# Patient Record
Sex: MALE | Race: WHITE | Employment: OTHER | URBAN - METROPOLITAN AREA
[De-identification: names, ages, dates, MRNs, and addresses within clinical notes are randomized per-mention and may not be internally consistent; named-entity substitution may affect disease eponyms.]

---

## 2017-06-16 PROBLEM — E21.3 HYPERPARATHYROIDISM (HCC): Chronic | Status: ACTIVE | Noted: 2017-06-16

## 2017-06-16 PROBLEM — E21.3 HYPERPARATHYROIDISM (HCC): Status: ACTIVE | Noted: 2017-06-16

## 2022-01-01 ENCOUNTER — APPOINTMENT (OUTPATIENT)
Dept: CT IMAGING | Age: 62
DRG: 208 | End: 2022-01-01
Payer: COMMERCIAL

## 2022-01-01 ENCOUNTER — APPOINTMENT (OUTPATIENT)
Dept: GENERAL RADIOLOGY | Age: 62
DRG: 208 | End: 2022-01-01
Payer: COMMERCIAL

## 2022-01-01 ENCOUNTER — HOSPITAL ENCOUNTER (INPATIENT)
Age: 62
LOS: 2 days | DRG: 208 | End: 2022-10-12
Attending: EMERGENCY MEDICINE | Admitting: INTERNAL MEDICINE
Payer: COMMERCIAL

## 2022-01-01 VITALS
HEIGHT: 62 IN | HEART RATE: 67 BPM | DIASTOLIC BLOOD PRESSURE: 40 MMHG | BODY MASS INDEX: 30.1 KG/M2 | OXYGEN SATURATION: 97 % | TEMPERATURE: 97 F | SYSTOLIC BLOOD PRESSURE: 107 MMHG | WEIGHT: 163.58 LBS | RESPIRATION RATE: 12 BRPM

## 2022-01-01 DIAGNOSIS — I46.9 CARDIAC ARREST (HCC): Primary | ICD-10-CM

## 2022-01-01 LAB
ABSOLUTE EOS #: 0 K/UL (ref 0–0.4)
ABSOLUTE EOS #: 0 K/UL (ref 0–0.4)
ABSOLUTE EOS #: 0.27 K/UL (ref 0–0.4)
ABSOLUTE IMMATURE GRANULOCYTE: 0 K/UL (ref 0–0.3)
ABSOLUTE IMMATURE GRANULOCYTE: 0 K/UL (ref 0–0.3)
ABSOLUTE IMMATURE GRANULOCYTE: 2.66 K/UL (ref 0–0.3)
ABSOLUTE LYMPH #: 1.33 K/UL (ref 1–4.8)
ABSOLUTE LYMPH #: 2.27 K/UL (ref 1–4.8)
ABSOLUTE LYMPH #: 2.94 K/UL (ref 1–4.8)
ABSOLUTE MONO #: 0.59 K/UL (ref 0.1–0.8)
ABSOLUTE MONO #: 1.52 K/UL (ref 0.1–0.8)
ABSOLUTE MONO #: 1.6 K/UL (ref 0.1–0.8)
ALBUMIN SERPL-MCNC: 2.9 G/DL (ref 3.5–5.2)
ALBUMIN SERPL-MCNC: 3 G/DL (ref 3.5–5.2)
ALBUMIN SERPL-MCNC: 3.3 G/DL (ref 3.5–5.2)
ALBUMIN/GLOBULIN RATIO: 1.3 (ref 1–2.5)
ALBUMIN/GLOBULIN RATIO: 1.4 (ref 1–2.5)
ALBUMIN/GLOBULIN RATIO: 1.5 (ref 1–2.5)
ALLEN TEST: ABNORMAL
ALLEN TEST: NEGATIVE
ALP BLD-CCNC: 174 U/L (ref 40–129)
ALP BLD-CCNC: 188 U/L (ref 40–129)
ALP BLD-CCNC: 204 U/L (ref 40–129)
ALT SERPL-CCNC: 103 U/L (ref 5–41)
ALT SERPL-CCNC: 119 U/L (ref 5–41)
ALT SERPL-CCNC: 173 U/L (ref 5–41)
ANION GAP SERPL CALCULATED.3IONS-SCNC: 15 MMOL/L (ref 9–17)
ANION GAP SERPL CALCULATED.3IONS-SCNC: 15 MMOL/L (ref 9–17)
ANION GAP SERPL CALCULATED.3IONS-SCNC: 17 MMOL/L (ref 9–17)
ANION GAP SERPL CALCULATED.3IONS-SCNC: 17 MMOL/L (ref 9–17)
ANION GAP SERPL CALCULATED.3IONS-SCNC: 19 MMOL/L (ref 9–17)
ANION GAP SERPL CALCULATED.3IONS-SCNC: 20 MMOL/L (ref 9–17)
ANION GAP SERPL CALCULATED.3IONS-SCNC: 20 MMOL/L (ref 9–17)
ANION GAP SERPL CALCULATED.3IONS-SCNC: 22 MMOL/L (ref 9–17)
ANION GAP SERPL CALCULATED.3IONS-SCNC: 22 MMOL/L (ref 9–17)
ANION GAP SERPL CALCULATED.3IONS-SCNC: 24 MMOL/L (ref 9–17)
ANION GAP SERPL CALCULATED.3IONS-SCNC: 28 MMOL/L (ref 9–17)
ANION GAP: 12 MMOL/L (ref 7–16)
ANION GAP: 13 MMOL/L (ref 7–16)
AST SERPL-CCNC: 265 U/L
AST SERPL-CCNC: 67 U/L
AST SERPL-CCNC: 94 U/L
BASOPHILS # BLD: 0 % (ref 0–2)
BASOPHILS ABSOLUTE: 0 K/UL (ref 0–0.2)
BILIRUB SERPL-MCNC: 0.4 MG/DL (ref 0.3–1.2)
BILIRUB SERPL-MCNC: 0.5 MG/DL (ref 0.3–1.2)
BILIRUB SERPL-MCNC: 0.5 MG/DL (ref 0.3–1.2)
BILIRUBIN URINE: ABNORMAL
BUN BLDV-MCNC: 32 MG/DL (ref 8–23)
BUN BLDV-MCNC: 34 MG/DL (ref 8–23)
BUN BLDV-MCNC: 36 MG/DL (ref 8–23)
BUN BLDV-MCNC: 37 MG/DL (ref 8–23)
BUN BLDV-MCNC: 39 MG/DL (ref 8–23)
BUN BLDV-MCNC: 41 MG/DL (ref 8–23)
BUN BLDV-MCNC: 46 MG/DL (ref 8–23)
BUN BLDV-MCNC: 50 MG/DL (ref 8–23)
BUN BLDV-MCNC: 56 MG/DL (ref 8–23)
BUN BLDV-MCNC: 61 MG/DL (ref 8–23)
BUN BLDV-MCNC: 63 MG/DL (ref 8–23)
CALCIUM IONIZED: 1.12 MMOL/L (ref 1.13–1.33)
CALCIUM IONIZED: 1.14 MMOL/L (ref 1.13–1.33)
CALCIUM IONIZED: 1.15 MMOL/L (ref 1.13–1.33)
CALCIUM IONIZED: 1.16 MMOL/L (ref 1.13–1.33)
CALCIUM IONIZED: 1.16 MMOL/L (ref 1.13–1.33)
CALCIUM IONIZED: 1.18 MMOL/L (ref 1.13–1.33)
CALCIUM IONIZED: 1.2 MMOL/L (ref 1.13–1.33)
CALCIUM SERPL-MCNC: 8.2 MG/DL (ref 8.6–10.4)
CALCIUM SERPL-MCNC: 8.3 MG/DL (ref 8.6–10.4)
CALCIUM SERPL-MCNC: 8.4 MG/DL (ref 8.6–10.4)
CALCIUM SERPL-MCNC: 8.5 MG/DL (ref 8.6–10.4)
CALCIUM SERPL-MCNC: 8.6 MG/DL (ref 8.6–10.4)
CASTS UA: ABNORMAL /LPF (ref 0–2)
CASTS UA: ABNORMAL /LPF (ref 0–2)
CHLORIDE BLD-SCNC: 91 MMOL/L (ref 98–107)
CHLORIDE BLD-SCNC: 92 MMOL/L (ref 98–107)
CHLORIDE BLD-SCNC: 93 MMOL/L (ref 98–107)
CHLORIDE BLD-SCNC: 93 MMOL/L (ref 98–107)
CHLORIDE BLD-SCNC: 94 MMOL/L (ref 98–107)
CHLORIDE BLD-SCNC: 94 MMOL/L (ref 98–107)
CHLORIDE BLD-SCNC: 95 MMOL/L (ref 98–107)
CHLORIDE BLD-SCNC: 96 MMOL/L (ref 98–107)
CHLORIDE BLD-SCNC: 96 MMOL/L (ref 98–107)
CHLORIDE BLD-SCNC: 97 MMOL/L (ref 98–107)
CHLORIDE BLD-SCNC: 97 MMOL/L (ref 98–107)
CO2: 12 MMOL/L (ref 20–31)
CO2: 15 MMOL/L (ref 20–31)
CO2: 16 MMOL/L (ref 20–31)
CO2: 17 MMOL/L (ref 20–31)
CO2: 19 MMOL/L (ref 20–31)
CO2: 20 MMOL/L (ref 20–31)
COLOR: ABNORMAL
CREAT SERPL-MCNC: 1.73 MG/DL (ref 0.7–1.2)
CREAT SERPL-MCNC: 1.81 MG/DL (ref 0.7–1.2)
CREAT SERPL-MCNC: 1.87 MG/DL (ref 0.7–1.2)
CREAT SERPL-MCNC: 2.01 MG/DL (ref 0.7–1.2)
CREAT SERPL-MCNC: 2.25 MG/DL (ref 0.7–1.2)
CREAT SERPL-MCNC: 2.52 MG/DL (ref 0.7–1.2)
CREAT SERPL-MCNC: 2.74 MG/DL (ref 0.7–1.2)
CREAT SERPL-MCNC: 3.11 MG/DL (ref 0.7–1.2)
CREAT SERPL-MCNC: 3.48 MG/DL (ref 0.7–1.2)
CREAT SERPL-MCNC: 4.3 MG/DL (ref 0.7–1.2)
CREAT SERPL-MCNC: 4.6 MG/DL (ref 0.7–1.2)
CULTURE: NO GROWTH
CULTURE: NORMAL
EGFR, POC: 14 ML/MIN/1.73M2
EGFR, POC: 16 ML/MIN/1.73M2
EKG ATRIAL RATE: 57 BPM
EKG ATRIAL RATE: 58 BPM
EKG ATRIAL RATE: 59 BPM
EKG P AXIS: 59 DEGREES
EKG P AXIS: 60 DEGREES
EKG P AXIS: 62 DEGREES
EKG P-R INTERVAL: 154 MS
EKG P-R INTERVAL: 160 MS
EKG P-R INTERVAL: 166 MS
EKG Q-T INTERVAL: 426 MS
EKG Q-T INTERVAL: 444 MS
EKG Q-T INTERVAL: 466 MS
EKG QRS DURATION: 112 MS
EKG QRS DURATION: 124 MS
EKG QRS DURATION: 126 MS
EKG QTC CALCULATION (BAZETT): 418 MS
EKG QTC CALCULATION (BAZETT): 439 MS
EKG QTC CALCULATION (BAZETT): 453 MS
EKG R AXIS: -19 DEGREES
EKG R AXIS: -29 DEGREES
EKG R AXIS: -30 DEGREES
EKG T AXIS: 149 DEGREES
EKG T AXIS: 172 DEGREES
EKG T AXIS: 180 DEGREES
EKG VENTRICULAR RATE: 57 BPM
EKG VENTRICULAR RATE: 58 BPM
EKG VENTRICULAR RATE: 59 BPM
EOSINOPHILS RELATIVE PERCENT: 0 % (ref 1–4)
EOSINOPHILS RELATIVE PERCENT: 0 % (ref 1–4)
EOSINOPHILS RELATIVE PERCENT: 1 % (ref 1–4)
EPITHELIAL CELLS UA: ABNORMAL /HPF (ref 0–5)
FIO2: 100
FIO2: 40
FIO2: 40
FIO2: 50
FIO2: 50
GFR NON-AFRICAN AMERICAN: 13 ML/MIN
GFR NON-AFRICAN AMERICAN: 15 ML/MIN
GFR SERPL CREATININE-BSD FRML MDRD: 14 ML/MIN/1.73M2
GFR SERPL CREATININE-BSD FRML MDRD: 15 ML/MIN/1.73M2
GFR SERPL CREATININE-BSD FRML MDRD: 16 ML/MIN
GFR SERPL CREATININE-BSD FRML MDRD: 18 ML/MIN
GFR SERPL CREATININE-BSD FRML MDRD: 19 ML/MIN/1.73M2
GFR SERPL CREATININE-BSD FRML MDRD: 22 ML/MIN/1.73M2
GFR SERPL CREATININE-BSD FRML MDRD: 25 ML/MIN/1.73M2
GFR SERPL CREATININE-BSD FRML MDRD: 28 ML/MIN/1.73M2
GFR SERPL CREATININE-BSD FRML MDRD: 32 ML/MIN/1.73M2
GFR SERPL CREATININE-BSD FRML MDRD: 37 ML/MIN/1.73M2
GFR SERPL CREATININE-BSD FRML MDRD: 40 ML/MIN/1.73M2
GFR SERPL CREATININE-BSD FRML MDRD: 41 ML/MIN/1.73M2
GFR SERPL CREATININE-BSD FRML MDRD: 44 ML/MIN/1.73M2
GLUCOSE BLD-MCNC: 115 MG/DL (ref 70–99)
GLUCOSE BLD-MCNC: 119 MG/DL (ref 75–110)
GLUCOSE BLD-MCNC: 123 MG/DL (ref 70–99)
GLUCOSE BLD-MCNC: 123 MG/DL (ref 74–100)
GLUCOSE BLD-MCNC: 123 MG/DL (ref 75–110)
GLUCOSE BLD-MCNC: 127 MG/DL (ref 75–110)
GLUCOSE BLD-MCNC: 130 MG/DL (ref 70–99)
GLUCOSE BLD-MCNC: 131 MG/DL (ref 74–100)
GLUCOSE BLD-MCNC: 135 MG/DL (ref 70–99)
GLUCOSE BLD-MCNC: 142 MG/DL (ref 70–99)
GLUCOSE BLD-MCNC: 155 MG/DL (ref 70–99)
GLUCOSE BLD-MCNC: 161 MG/DL (ref 70–99)
GLUCOSE BLD-MCNC: 164 MG/DL (ref 70–99)
GLUCOSE BLD-MCNC: 165 MG/DL (ref 70–99)
GLUCOSE BLD-MCNC: 165 MG/DL (ref 75–110)
GLUCOSE BLD-MCNC: 199 MG/DL (ref 70–99)
GLUCOSE BLD-MCNC: 206 MG/DL (ref 70–99)
GLUCOSE BLD-MCNC: 266 MG/DL (ref 74–100)
GLUCOSE BLD-MCNC: 88 MG/DL (ref 74–100)
GLUCOSE URINE: NEGATIVE
HCO3 VENOUS: 17.7 MMOL/L (ref 22–29)
HCT VFR BLD CALC: 23.5 % (ref 40.7–50.3)
HCT VFR BLD CALC: 23.9 % (ref 40.7–50.3)
HCT VFR BLD CALC: 25.3 % (ref 40.7–50.3)
HCT VFR BLD CALC: 27.5 % (ref 40.7–50.3)
HEMOGLOBIN: 8 G/DL (ref 13–17)
HEMOGLOBIN: 8 G/DL (ref 13–17)
HEMOGLOBIN: 8.2 G/DL (ref 13–17)
HEMOGLOBIN: 8.6 G/DL (ref 13–17)
IMMATURE GRANULOCYTES: 0 %
IMMATURE GRANULOCYTES: 0 %
IMMATURE GRANULOCYTES: 10 %
INR BLD: 1.2
INR BLD: 1.4
KETONES, URINE: ABNORMAL
LACTIC ACID, WHOLE BLOOD: 1.9 MMOL/L (ref 0.7–2.1)
LACTIC ACID, WHOLE BLOOD: 2.6 MMOL/L (ref 0.7–2.1)
LACTIC ACID, WHOLE BLOOD: 3.1 MMOL/L (ref 0.7–2.1)
LACTIC ACID, WHOLE BLOOD: 3.4 MMOL/L (ref 0.7–2.1)
LACTIC ACID, WHOLE BLOOD: 4 MMOL/L (ref 0.7–2.1)
LACTIC ACID, WHOLE BLOOD: 4.8 MMOL/L (ref 0.7–2.1)
LACTIC ACID, WHOLE BLOOD: 6.2 MMOL/L (ref 0.7–2.1)
LEUKOCYTE ESTERASE, URINE: ABNORMAL
LIPASE: 31 U/L (ref 13–60)
LYMPHOCYTES # BLD: 10 % (ref 24–44)
LYMPHOCYTES # BLD: 5 % (ref 24–44)
LYMPHOCYTES # BLD: 6 % (ref 24–44)
MAGNESIUM: 1.8 MG/DL (ref 1.6–2.6)
MAGNESIUM: 1.9 MG/DL (ref 1.6–2.6)
MAGNESIUM: 2 MG/DL (ref 1.6–2.6)
MAGNESIUM: 2.2 MG/DL (ref 1.6–2.6)
MAGNESIUM: 2.2 MG/DL (ref 1.6–2.6)
MAGNESIUM: 2.3 MG/DL (ref 1.6–2.6)
MCH RBC QN AUTO: 28 PG (ref 25.2–33.5)
MCH RBC QN AUTO: 28.1 PG (ref 25.2–33.5)
MCH RBC QN AUTO: 28.2 PG (ref 25.2–33.5)
MCH RBC QN AUTO: 29.2 PG (ref 25.2–33.5)
MCHC RBC AUTO-ENTMCNC: 31.3 G/DL (ref 28.4–34.8)
MCHC RBC AUTO-ENTMCNC: 32.4 G/DL (ref 28.4–34.8)
MCHC RBC AUTO-ENTMCNC: 33.5 G/DL (ref 28.4–34.8)
MCHC RBC AUTO-ENTMCNC: 34 G/DL (ref 28.4–34.8)
MCV RBC AUTO: 83.9 FL (ref 82.6–102.9)
MCV RBC AUTO: 85.8 FL (ref 82.6–102.9)
MCV RBC AUTO: 86.9 FL (ref 82.6–102.9)
MCV RBC AUTO: 89.6 FL (ref 82.6–102.9)
MODE: ABNORMAL
MODE: ABNORMAL
MONOCYTES # BLD: 2 % (ref 1–7)
MONOCYTES # BLD: 4 % (ref 1–7)
MONOCYTES # BLD: 6 % (ref 1–7)
MORPHOLOGY: ABNORMAL
MRSA, DNA, NASAL: NEGATIVE
NEGATIVE BASE EXCESS, ART: 13 (ref 0–2)
NEGATIVE BASE EXCESS, ART: 4 (ref 0–2)
NEGATIVE BASE EXCESS, ART: 7 (ref 0–2)
NEGATIVE BASE EXCESS, ART: 8 (ref 0–2)
NEGATIVE BASE EXCESS, ART: 8 (ref 0–2)
NEGATIVE BASE EXCESS, VEN: 10 (ref 0–2)
NITRITE, URINE: NEGATIVE
NRBC AUTOMATED: 0.1 PER 100 WBC
NRBC AUTOMATED: 0.2 PER 100 WBC
O2 DEVICE/FLOW/%: ABNORMAL
O2 DEVICE/FLOW/%: ABNORMAL
O2 SAT, VEN: 20 % (ref 60–85)
PARTIAL THROMBOPLASTIN TIME: 115.5 SEC (ref 20.5–30.5)
PARTIAL THROMBOPLASTIN TIME: 21.9 SEC (ref 20.5–30.5)
PARTIAL THROMBOPLASTIN TIME: 24 SEC (ref 20.5–30.5)
PARTIAL THROMBOPLASTIN TIME: 31.1 SEC (ref 20.5–30.5)
PARTIAL THROMBOPLASTIN TIME: 42.8 SEC (ref 20.5–30.5)
PARTIAL THROMBOPLASTIN TIME: 43.5 SEC (ref 20.5–30.5)
PARTIAL THROMBOPLASTIN TIME: 53.6 SEC (ref 20.5–30.5)
PARTIAL THROMBOPLASTIN TIME: 97.8 SEC (ref 20.5–30.5)
PARTIAL THROMBOPLASTIN TIME: >120 SEC (ref 20.5–30.5)
PATIENT TEMP: 32.5
PATIENT TEMP: 35.6
PCO2, VEN: 44.1 MM HG (ref 41–51)
PDW BLD-RTO: 15.5 % (ref 11.8–14.4)
PDW BLD-RTO: 15.6 % (ref 11.8–14.4)
PDW BLD-RTO: 15.8 % (ref 11.8–14.4)
PDW BLD-RTO: 15.8 % (ref 11.8–14.4)
PH UA: 5 (ref 5–8)
PH VENOUS: 7.21 (ref 7.32–7.43)
PHOSPHORUS: 4.8 MG/DL (ref 2.5–4.5)
PHOSPHORUS: 4.9 MG/DL (ref 2.5–4.5)
PHOSPHORUS: 5.2 MG/DL (ref 2.5–4.5)
PHOSPHORUS: 5.3 MG/DL (ref 2.5–4.5)
PHOSPHORUS: 5.4 MG/DL (ref 2.5–4.5)
PHOSPHORUS: 5.5 MG/DL (ref 2.5–4.5)
PHOSPHORUS: 6.1 MG/DL (ref 2.5–4.5)
PHOSPHORUS: 6.4 MG/DL (ref 2.5–4.5)
PLATELET # BLD: 334 K/UL (ref 138–453)
PLATELET # BLD: 336 K/UL (ref 138–453)
PLATELET # BLD: 347 K/UL (ref 138–453)
PLATELET # BLD: 436 K/UL (ref 138–453)
PMV BLD AUTO: 10.6 FL (ref 8.1–13.5)
PMV BLD AUTO: 10.7 FL (ref 8.1–13.5)
PMV BLD AUTO: 10.9 FL (ref 8.1–13.5)
PMV BLD AUTO: 10.9 FL (ref 8.1–13.5)
PO2, VEN: 18.5 MM HG (ref 30–50)
POC BUN: 67 MG/DL (ref 8–26)
POC BUN: 69 MG/DL (ref 8–26)
POC CHLORIDE: 96 MMOL/L (ref 98–107)
POC CHLORIDE: 97 MMOL/L (ref 98–107)
POC CREATININE: 4.09 MG/DL (ref 0.51–1.19)
POC CREATININE: 4.51 MG/DL (ref 0.51–1.19)
POC HCO3: 11.3 MMOL/L (ref 21–28)
POC HCO3: 16.1 MMOL/L (ref 21–28)
POC HCO3: 18.2 MMOL/L (ref 21–28)
POC HCO3: 18.7 MMOL/L (ref 21–28)
POC HCO3: 21.3 MMOL/L (ref 21–28)
POC HEMATOCRIT: 23 % (ref 41–53)
POC HEMATOCRIT: 24 % (ref 41–53)
POC HEMOGLOBIN: 8 G/DL (ref 13.5–17.5)
POC HEMOGLOBIN: 8.3 G/DL (ref 13.5–17.5)
POC IONIZED CALCIUM: 1.1 MMOL/L (ref 1.15–1.33)
POC IONIZED CALCIUM: 1.11 MMOL/L (ref 1.15–1.33)
POC LACTIC ACID: 2.21 MMOL/L (ref 0.56–1.39)
POC LACTIC ACID: 5.1 MMOL/L (ref 0.56–1.39)
POC O2 SATURATION: 100 % (ref 94–98)
POC O2 SATURATION: 97 % (ref 94–98)
POC O2 SATURATION: 99 % (ref 94–98)
POC PCO2 TEMP: 31 MM HG
POC PCO2: 18.7 MM HG (ref 35–48)
POC PCO2: 27.6 MM HG (ref 35–48)
POC PCO2: 35.7 MM HG (ref 35–48)
POC PCO2: 37.6 MM HG (ref 35–48)
POC PCO2: 38.6 MM HG (ref 35–48)
POC PH TEMP: 7.36
POC PH: 7.29 (ref 7.35–7.45)
POC PH: 7.33 (ref 7.35–7.45)
POC PH: 7.35 (ref 7.35–7.45)
POC PH: 7.37 (ref 7.35–7.45)
POC PH: 7.39 (ref 7.35–7.45)
POC PO2 TEMP: 76 MM HG
POC PO2: 132.4 MM HG (ref 83–108)
POC PO2: 136 MM HG (ref 83–108)
POC PO2: 153.1 MM HG (ref 83–108)
POC PO2: 243.2 MM HG (ref 83–108)
POC PO2: 99.8 MM HG (ref 83–108)
POC POTASSIUM: 5 MMOL/L (ref 3.5–4.5)
POC POTASSIUM: 5.6 MMOL/L (ref 3.5–4.5)
POC SODIUM: 126 MMOL/L (ref 138–146)
POC SODIUM: 127 MMOL/L (ref 138–146)
POC TCO2: 18 MMOL/L (ref 22–30)
POC TCO2: 19 MMOL/L (ref 22–30)
POTASSIUM SERPL-SCNC: 4 MMOL/L (ref 3.7–5.3)
POTASSIUM SERPL-SCNC: 4.1 MMOL/L (ref 3.7–5.3)
POTASSIUM SERPL-SCNC: 4.2 MMOL/L (ref 3.7–5.3)
POTASSIUM SERPL-SCNC: 4.3 MMOL/L (ref 3.7–5.3)
POTASSIUM SERPL-SCNC: 4.6 MMOL/L (ref 3.7–5.3)
POTASSIUM SERPL-SCNC: 4.6 MMOL/L (ref 3.7–5.3)
POTASSIUM SERPL-SCNC: 4.7 MMOL/L (ref 3.7–5.3)
POTASSIUM SERPL-SCNC: 4.8 MMOL/L (ref 3.7–5.3)
POTASSIUM SERPL-SCNC: 4.9 MMOL/L (ref 3.7–5.3)
POTASSIUM SERPL-SCNC: 4.9 MMOL/L (ref 3.7–5.3)
POTASSIUM SERPL-SCNC: 5.2 MMOL/L (ref 3.7–5.3)
POTASSIUM SERPL-SCNC: 5.8 MMOL/L (ref 3.7–5.3)
PRO-BNP: ABNORMAL PG/ML
PROTEIN UA: ABNORMAL
PROTHROMBIN TIME: 11.9 SEC (ref 9.1–12.3)
PROTHROMBIN TIME: 13.6 SEC (ref 9.1–12.3)
RBC # BLD: 2.74 M/UL (ref 4.21–5.77)
RBC # BLD: 2.85 M/UL (ref 4.21–5.77)
RBC # BLD: 2.91 M/UL (ref 4.21–5.77)
RBC # BLD: 3.07 M/UL (ref 4.21–5.77)
RBC UA: ABNORMAL /HPF (ref 0–2)
REASON FOR REJECTION: NORMAL
SAMPLE SITE: ABNORMAL
SEG NEUTROPHILS: 78 % (ref 36–66)
SEG NEUTROPHILS: 88 % (ref 36–66)
SEG NEUTROPHILS: 90 % (ref 36–66)
SEGMENTED NEUTROPHILS ABSOLUTE COUNT: 20.74 K/UL (ref 1.8–7.7)
SEGMENTED NEUTROPHILS ABSOLUTE COUNT: 25.87 K/UL (ref 1.8–7.7)
SEGMENTED NEUTROPHILS ABSOLUTE COUNT: 34.11 K/UL (ref 1.8–7.7)
SODIUM BLD-SCNC: 126 MMOL/L (ref 135–144)
SODIUM BLD-SCNC: 127 MMOL/L (ref 135–144)
SODIUM BLD-SCNC: 129 MMOL/L (ref 135–144)
SODIUM BLD-SCNC: 131 MMOL/L (ref 135–144)
SODIUM BLD-SCNC: 131 MMOL/L (ref 135–144)
SODIUM BLD-SCNC: 132 MMOL/L (ref 135–144)
SODIUM BLD-SCNC: 132 MMOL/L (ref 135–144)
SODIUM BLD-SCNC: 133 MMOL/L (ref 135–144)
SODIUM BLD-SCNC: 134 MMOL/L (ref 135–144)
SODIUM BLD-SCNC: 135 MMOL/L (ref 135–144)
SODIUM BLD-SCNC: 137 MMOL/L (ref 135–144)
SPECIFIC GRAVITY UA: 1.02 (ref 1–1.03)
SPECIMEN DESCRIPTION: NORMAL
TOTAL PROTEIN: 5.1 G/DL (ref 6.4–8.3)
TOTAL PROTEIN: 5.1 G/DL (ref 6.4–8.3)
TOTAL PROTEIN: 5.5 G/DL (ref 6.4–8.3)
TROPONIN, HIGH SENSITIVITY: 2022 NG/L (ref 0–22)
TROPONIN, HIGH SENSITIVITY: 2027 NG/L (ref 0–22)
TROPONIN, HIGH SENSITIVITY: 2061 NG/L (ref 0–22)
TROPONIN, HIGH SENSITIVITY: 2711 NG/L (ref 0–22)
TROPONIN, HIGH SENSITIVITY: 6838 NG/L (ref 0–22)
TROPONIN, HIGH SENSITIVITY: 7237 NG/L (ref 0–22)
TROPONIN, HIGH SENSITIVITY: 7513 NG/L (ref 0–22)
TSH SERPL DL<=0.05 MIU/L-ACNC: 6.38 UIU/ML (ref 0.3–5)
TURBIDITY: ABNORMAL
URINE HGB: NEGATIVE
UROBILINOGEN, URINE: NORMAL
WBC # BLD: 26.6 K/UL (ref 3.5–11.3)
WBC # BLD: 29.4 K/UL (ref 3.5–11.3)
WBC # BLD: 33.1 K/UL (ref 3.5–11.3)
WBC # BLD: 37.9 K/UL (ref 3.5–11.3)
WBC UA: ABNORMAL /HPF (ref 0–5)
ZZ NTE CLEAN UP: ORDERED TEST: NORMAL
ZZ NTE WITH NAME CLEAN UP: SPECIMEN SOURCE: NORMAL

## 2022-01-01 PROCEDURE — 2580000003 HC RX 258: Performed by: INTERNAL MEDICINE

## 2022-01-01 PROCEDURE — 95720 EEG PHY/QHP EA INCR W/VEEG: CPT | Performed by: PSYCHIATRY & NEUROLOGY

## 2022-01-01 PROCEDURE — 6360000002 HC RX W HCPCS: Performed by: STUDENT IN AN ORGANIZED HEALTH CARE EDUCATION/TRAINING PROGRAM

## 2022-01-01 PROCEDURE — 2500000003 HC RX 250 WO HCPCS: Performed by: STUDENT IN AN ORGANIZED HEALTH CARE EDUCATION/TRAINING PROGRAM

## 2022-01-01 PROCEDURE — 6370000000 HC RX 637 (ALT 250 FOR IP)

## 2022-01-01 PROCEDURE — 37799 UNLISTED PX VASCULAR SURGERY: CPT

## 2022-01-01 PROCEDURE — 06HY33Z INSERTION OF INFUSION DEVICE INTO LOWER VEIN, PERCUTANEOUS APPROACH: ICD-10-PCS | Performed by: INTERNAL MEDICINE

## 2022-01-01 PROCEDURE — 85025 COMPLETE CBC W/AUTO DIFF WBC: CPT

## 2022-01-01 PROCEDURE — 2580000003 HC RX 258

## 2022-01-01 PROCEDURE — B54CZZA ULTRASONOGRAPHY OF LEFT LOWER EXTREMITY VEINS, GUIDANCE: ICD-10-PCS | Performed by: EMERGENCY MEDICINE

## 2022-01-01 PROCEDURE — 2500000003 HC RX 250 WO HCPCS: Performed by: NURSE PRACTITIONER

## 2022-01-01 PROCEDURE — C9113 INJ PANTOPRAZOLE SODIUM, VIA: HCPCS | Performed by: STUDENT IN AN ORGANIZED HEALTH CARE EDUCATION/TRAINING PROGRAM

## 2022-01-01 PROCEDURE — 2500000003 HC RX 250 WO HCPCS: Performed by: INTERNAL MEDICINE

## 2022-01-01 PROCEDURE — 95714 VEEG EA 12-26 HR UNMNTR: CPT

## 2022-01-01 PROCEDURE — 94761 N-INVAS EAR/PLS OXIMETRY MLT: CPT

## 2022-01-01 PROCEDURE — 82803 BLOOD GASES ANY COMBINATION: CPT

## 2022-01-01 PROCEDURE — 85730 THROMBOPLASTIN TIME PARTIAL: CPT

## 2022-01-01 PROCEDURE — 84484 ASSAY OF TROPONIN QUANT: CPT

## 2022-01-01 PROCEDURE — 5A1945Z RESPIRATORY VENTILATION, 24-96 CONSECUTIVE HOURS: ICD-10-PCS | Performed by: INTERNAL MEDICINE

## 2022-01-01 PROCEDURE — 99232 SBSQ HOSP IP/OBS MODERATE 35: CPT | Performed by: INTERNAL MEDICINE

## 2022-01-01 PROCEDURE — 80048 BASIC METABOLIC PNL TOTAL CA: CPT

## 2022-01-01 PROCEDURE — 83880 ASSAY OF NATRIURETIC PEPTIDE: CPT

## 2022-01-01 PROCEDURE — 82330 ASSAY OF CALCIUM: CPT

## 2022-01-01 PROCEDURE — 70450 CT HEAD/BRAIN W/O DYE: CPT

## 2022-01-01 PROCEDURE — 95711 VEEG 2-12 HR UNMONITORED: CPT

## 2022-01-01 PROCEDURE — 6360000002 HC RX W HCPCS: Performed by: INTERNAL MEDICINE

## 2022-01-01 PROCEDURE — 85027 COMPLETE CBC AUTOMATED: CPT

## 2022-01-01 PROCEDURE — 71045 X-RAY EXAM CHEST 1 VIEW: CPT

## 2022-01-01 PROCEDURE — 2580000003 HC RX 258: Performed by: STUDENT IN AN ORGANIZED HEALTH CARE EDUCATION/TRAINING PROGRAM

## 2022-01-01 PROCEDURE — 6360000002 HC RX W HCPCS: Performed by: NURSE PRACTITIONER

## 2022-01-01 PROCEDURE — 83690 ASSAY OF LIPASE: CPT

## 2022-01-01 PROCEDURE — 6360000002 HC RX W HCPCS

## 2022-01-01 PROCEDURE — 81001 URINALYSIS AUTO W/SCOPE: CPT

## 2022-01-01 PROCEDURE — 99291 CRITICAL CARE FIRST HOUR: CPT | Performed by: INTERNAL MEDICINE

## 2022-01-01 PROCEDURE — 2700000000 HC OXYGEN THERAPY PER DAY

## 2022-01-01 PROCEDURE — C1894 INTRO/SHEATH, NON-LASER: HCPCS

## 2022-01-01 PROCEDURE — 82947 ASSAY GLUCOSE BLOOD QUANT: CPT

## 2022-01-01 PROCEDURE — 83735 ASSAY OF MAGNESIUM: CPT

## 2022-01-01 PROCEDURE — 99285 EMERGENCY DEPT VISIT HI MDM: CPT

## 2022-01-01 PROCEDURE — B54BZZA ULTRASONOGRAPHY OF RIGHT LOWER EXTREMITY VEINS, GUIDANCE: ICD-10-PCS | Performed by: INTERNAL MEDICINE

## 2022-01-01 PROCEDURE — 94003 VENT MGMT INPAT SUBQ DAY: CPT

## 2022-01-01 PROCEDURE — 80053 COMPREHEN METABOLIC PANEL: CPT

## 2022-01-01 PROCEDURE — 95718 EEG PHYS/QHP 2-12 HR W/VEEG: CPT | Performed by: PSYCHIATRY & NEUROLOGY

## 2022-01-01 PROCEDURE — 93010 ELECTROCARDIOGRAM REPORT: CPT | Performed by: INTERNAL MEDICINE

## 2022-01-01 PROCEDURE — 83605 ASSAY OF LACTIC ACID: CPT

## 2022-01-01 PROCEDURE — 84132 ASSAY OF SERUM POTASSIUM: CPT

## 2022-01-01 PROCEDURE — 74018 RADEX ABDOMEN 1 VIEW: CPT

## 2022-01-01 PROCEDURE — C1892 INTRO/SHEATH,FIXED,PEEL-AWAY: HCPCS

## 2022-01-01 PROCEDURE — 99222 1ST HOSP IP/OBS MODERATE 55: CPT | Performed by: FAMILY MEDICINE

## 2022-01-01 PROCEDURE — 93005 ELECTROCARDIOGRAM TRACING: CPT | Performed by: STUDENT IN AN ORGANIZED HEALTH CARE EDUCATION/TRAINING PROGRAM

## 2022-01-01 PROCEDURE — 90945 DIALYSIS ONE EVALUATION: CPT

## 2022-01-01 PROCEDURE — 2709999900 HC NON-CHARGEABLE SUPPLY

## 2022-01-01 PROCEDURE — 93005 ELECTROCARDIOGRAM TRACING: CPT | Performed by: EMERGENCY MEDICINE

## 2022-01-01 PROCEDURE — 06HY33Z INSERTION OF INFUSION DEVICE INTO LOWER VEIN, PERCUTANEOUS APPROACH: ICD-10-PCS | Performed by: EMERGENCY MEDICINE

## 2022-01-01 PROCEDURE — 36415 COLL VENOUS BLD VENIPUNCTURE: CPT

## 2022-01-01 PROCEDURE — 94002 VENT MGMT INPAT INIT DAY: CPT

## 2022-01-01 PROCEDURE — 95700 EEG CONT REC W/VID EEG TECH: CPT

## 2022-01-01 PROCEDURE — 87086 URINE CULTURE/COLONY COUNT: CPT

## 2022-01-01 PROCEDURE — 84100 ASSAY OF PHOSPHORUS: CPT

## 2022-01-01 PROCEDURE — 87641 MR-STAPH DNA AMP PROBE: CPT

## 2022-01-01 PROCEDURE — 2000000000 HC ICU R&B

## 2022-01-01 PROCEDURE — 99233 SBSQ HOSP IP/OBS HIGH 50: CPT | Performed by: INTERNAL MEDICINE

## 2022-01-01 PROCEDURE — 99212 OFFICE O/P EST SF 10 MIN: CPT

## 2022-01-01 PROCEDURE — 84520 ASSAY OF UREA NITROGEN: CPT

## 2022-01-01 PROCEDURE — 82565 ASSAY OF CREATININE: CPT

## 2022-01-01 PROCEDURE — 85014 HEMATOCRIT: CPT

## 2022-01-01 PROCEDURE — 90945 DIALYSIS ONE EVALUATION: CPT | Performed by: INTERNAL MEDICINE

## 2022-01-01 PROCEDURE — 85610 PROTHROMBIN TIME: CPT

## 2022-01-01 PROCEDURE — 2500000003 HC RX 250 WO HCPCS

## 2022-01-01 PROCEDURE — 84443 ASSAY THYROID STIM HORMONE: CPT

## 2022-01-01 PROCEDURE — 87040 BLOOD CULTURE FOR BACTERIA: CPT

## 2022-01-01 PROCEDURE — 86316 IMMUNOASSAY TUMOR OTHER: CPT

## 2022-01-01 PROCEDURE — 80051 ELECTROLYTE PANEL: CPT

## 2022-01-01 RX ORDER — MIDAZOLAM HYDROCHLORIDE 1 MG/ML
INJECTION INTRAMUSCULAR; INTRAVENOUS
Status: DISPENSED
Start: 2022-01-01 | End: 2022-01-01

## 2022-01-01 RX ORDER — MAGNESIUM SULFATE 1 G/100ML
1000 INJECTION INTRAVENOUS PRN
Status: DISCONTINUED | OUTPATIENT
Start: 2022-01-01 | End: 2022-10-13 | Stop reason: HOSPADM

## 2022-01-01 RX ORDER — HEPARIN SODIUM AND DEXTROSE 10000; 5 [USP'U]/100ML; G/100ML
5-30 INJECTION INTRAVENOUS CONTINUOUS
Status: DISCONTINUED | OUTPATIENT
Start: 2022-01-01 | End: 2022-10-13 | Stop reason: HOSPADM

## 2022-01-01 RX ORDER — HEPARIN SODIUM AND DEXTROSE 10000; 5 [USP'U]/100ML; G/100ML
5-30 INJECTION INTRAVENOUS CONTINUOUS
Status: DISCONTINUED | OUTPATIENT
Start: 2022-01-01 | End: 2022-01-01

## 2022-01-01 RX ORDER — SODIUM CHLORIDE 0.9 % (FLUSH) 0.9 %
5-40 SYRINGE (ML) INJECTION EVERY 12 HOURS SCHEDULED
Status: DISCONTINUED | OUTPATIENT
Start: 2022-01-01 | End: 2022-10-13 | Stop reason: HOSPADM

## 2022-01-01 RX ORDER — LEVETIRACETAM 500 MG/5ML
1500 INJECTION, SOLUTION, CONCENTRATE INTRAVENOUS ONCE
Status: DISCONTINUED | OUTPATIENT
Start: 2022-01-01 | End: 2022-01-01

## 2022-01-01 RX ORDER — SODIUM CHLORIDE 0.9 % (FLUSH) 0.9 %
5-40 SYRINGE (ML) INJECTION PRN
Status: DISCONTINUED | OUTPATIENT
Start: 2022-01-01 | End: 2022-10-13 | Stop reason: HOSPADM

## 2022-01-01 RX ORDER — CHLORHEXIDINE GLUCONATE 0.12 MG/ML
15 RINSE ORAL 2 TIMES DAILY
Status: DISCONTINUED | OUTPATIENT
Start: 2022-01-01 | End: 2022-10-13 | Stop reason: HOSPADM

## 2022-01-01 RX ORDER — ONDANSETRON 4 MG/1
4 TABLET, ORALLY DISINTEGRATING ORAL EVERY 8 HOURS PRN
Status: DISCONTINUED | OUTPATIENT
Start: 2022-01-01 | End: 2022-10-13 | Stop reason: HOSPADM

## 2022-01-01 RX ORDER — MIDAZOLAM HYDROCHLORIDE 2 MG/2ML
6 INJECTION, SOLUTION INTRAMUSCULAR; INTRAVENOUS ONCE
Status: DISCONTINUED | OUTPATIENT
Start: 2022-01-01 | End: 2022-10-13 | Stop reason: HOSPADM

## 2022-01-01 RX ORDER — POLYETHYLENE GLYCOL 3350 17 G/17G
17 POWDER, FOR SOLUTION ORAL DAILY PRN
Status: DISCONTINUED | OUTPATIENT
Start: 2022-01-01 | End: 2022-10-13 | Stop reason: HOSPADM

## 2022-01-01 RX ORDER — ATROPINE SULFATE 0.1 MG/ML
INJECTION INTRAVENOUS
Status: DISPENSED
Start: 2022-01-01 | End: 2022-01-01

## 2022-01-01 RX ORDER — CALCIUM GLUCONATE 20 MG/ML
2000 INJECTION, SOLUTION INTRAVENOUS PRN
Status: DISCONTINUED | OUTPATIENT
Start: 2022-01-01 | End: 2022-10-13 | Stop reason: HOSPADM

## 2022-01-01 RX ORDER — NOREPINEPHRINE BIT/0.9 % NACL 16MG/250ML
1-100 INFUSION BOTTLE (ML) INTRAVENOUS CONTINUOUS
Status: DISCONTINUED | OUTPATIENT
Start: 2022-01-01 | End: 2022-10-13 | Stop reason: HOSPADM

## 2022-01-01 RX ORDER — CALCIUM GLUCONATE 20 MG/ML
2000 INJECTION, SOLUTION INTRAVENOUS ONCE
Status: COMPLETED | OUTPATIENT
Start: 2022-01-01 | End: 2022-01-01

## 2022-01-01 RX ORDER — MIDAZOLAM HYDROCHLORIDE 10 MG/2ML
6 INJECTION, SOLUTION INTRAMUSCULAR; INTRAVENOUS ONCE
Status: COMPLETED | OUTPATIENT
Start: 2022-01-01 | End: 2022-01-01

## 2022-01-01 RX ORDER — SODIUM CHLORIDE 9 MG/ML
INJECTION, SOLUTION INTRAVENOUS PRN
Status: DISCONTINUED | OUTPATIENT
Start: 2022-01-01 | End: 2022-10-13 | Stop reason: HOSPADM

## 2022-01-01 RX ORDER — HEPARIN SODIUM 1000 [USP'U]/ML
4000 INJECTION, SOLUTION INTRAVENOUS; SUBCUTANEOUS ONCE
Status: COMPLETED | OUTPATIENT
Start: 2022-01-01 | End: 2022-01-01

## 2022-01-01 RX ORDER — ONDANSETRON 2 MG/ML
4 INJECTION INTRAMUSCULAR; INTRAVENOUS EVERY 6 HOURS PRN
Status: DISCONTINUED | OUTPATIENT
Start: 2022-01-01 | End: 2022-01-01

## 2022-01-01 RX ORDER — HEPARIN SODIUM 1000 [USP'U]/ML
1700 INJECTION, SOLUTION INTRAVENOUS; SUBCUTANEOUS PRN
Status: DISCONTINUED | OUTPATIENT
Start: 2022-01-01 | End: 2022-10-13 | Stop reason: HOSPADM

## 2022-01-01 RX ORDER — HEPARIN SODIUM 1000 [USP'U]/ML
2000 INJECTION, SOLUTION INTRAVENOUS; SUBCUTANEOUS PRN
Status: DISCONTINUED | OUTPATIENT
Start: 2022-01-01 | End: 2022-10-13 | Stop reason: HOSPADM

## 2022-01-01 RX ORDER — HEPARIN SODIUM 1000 [USP'U]/ML
4000 INJECTION, SOLUTION INTRAVENOUS; SUBCUTANEOUS PRN
Status: DISCONTINUED | OUTPATIENT
Start: 2022-01-01 | End: 2022-10-13 | Stop reason: HOSPADM

## 2022-01-01 RX ORDER — VANCOMYCIN HYDROCHLORIDE 1 G/200ML
15 INJECTION, SOLUTION INTRAVENOUS ONCE
Status: DISCONTINUED | OUTPATIENT
Start: 2022-01-01 | End: 2022-01-01

## 2022-01-01 RX ORDER — HEPARIN SODIUM 5000 [USP'U]/ML
5000 INJECTION, SOLUTION INTRAVENOUS; SUBCUTANEOUS EVERY 8 HOURS SCHEDULED
Status: DISCONTINUED | OUTPATIENT
Start: 2022-01-01 | End: 2022-01-01

## 2022-01-01 RX ORDER — LEVETIRACETAM 500 MG/5ML
500 INJECTION, SOLUTION, CONCENTRATE INTRAVENOUS EVERY 12 HOURS
Status: DISCONTINUED | OUTPATIENT
Start: 2022-01-01 | End: 2022-10-13 | Stop reason: HOSPADM

## 2022-01-01 RX ORDER — VECURONIUM BROMIDE 1 MG/ML
5 INJECTION, POWDER, LYOPHILIZED, FOR SOLUTION INTRAVENOUS ONCE
Status: COMPLETED | OUTPATIENT
Start: 2022-01-01 | End: 2022-01-01

## 2022-01-01 RX ORDER — DEXTROSE MONOHYDRATE 100 MG/ML
INJECTION, SOLUTION INTRAVENOUS
Status: COMPLETED
Start: 2022-01-01 | End: 2022-01-01

## 2022-01-01 RX ORDER — CALCIUM GLUCONATE 20 MG/ML
1000 INJECTION, SOLUTION INTRAVENOUS PRN
Status: DISCONTINUED | OUTPATIENT
Start: 2022-01-01 | End: 2022-10-13 | Stop reason: HOSPADM

## 2022-01-01 RX ORDER — MIDAZOLAM HYDROCHLORIDE 10 MG/2ML
6 INJECTION, SOLUTION INTRAMUSCULAR; INTRAVENOUS ONCE
Status: DISCONTINUED | OUTPATIENT
Start: 2022-01-01 | End: 2022-01-01 | Stop reason: SDUPTHER

## 2022-01-01 RX ORDER — LEVETIRACETAM 15 MG/ML
1500 INJECTION INTRAVASCULAR ONCE
Status: COMPLETED | OUTPATIENT
Start: 2022-01-01 | End: 2022-01-01

## 2022-01-01 RX ORDER — HEPARIN SODIUM 1000 [USP'U]/ML
1800 INJECTION, SOLUTION INTRAVENOUS; SUBCUTANEOUS PRN
Status: DISCONTINUED | OUTPATIENT
Start: 2022-01-01 | End: 2022-10-13 | Stop reason: HOSPADM

## 2022-01-01 RX ORDER — ACETAMINOPHEN 650 MG/1
650 SUPPOSITORY RECTAL EVERY 6 HOURS PRN
Status: DISCONTINUED | OUTPATIENT
Start: 2022-01-01 | End: 2022-10-13 | Stop reason: HOSPADM

## 2022-01-01 RX ORDER — ONDANSETRON 4 MG/1
4 TABLET, ORALLY DISINTEGRATING ORAL EVERY 8 HOURS PRN
Status: DISCONTINUED | OUTPATIENT
Start: 2022-01-01 | End: 2022-01-01

## 2022-01-01 RX ORDER — ACETAMINOPHEN 325 MG/1
650 TABLET ORAL EVERY 6 HOURS PRN
Status: DISCONTINUED | OUTPATIENT
Start: 2022-01-01 | End: 2022-10-13 | Stop reason: HOSPADM

## 2022-01-01 RX ORDER — 0.9 % SODIUM CHLORIDE 0.9 %
1000 INTRAVENOUS SOLUTION INTRAVENOUS ONCE
Status: COMPLETED | OUTPATIENT
Start: 2022-01-01 | End: 2022-01-01

## 2022-01-01 RX ORDER — PHENTOLAMINE MESYLATE 5 MG/1
5 INJECTION INTRAMUSCULAR; INTRAVENOUS ONCE
Status: COMPLETED | OUTPATIENT
Start: 2022-01-01 | End: 2022-01-01

## 2022-01-01 RX ORDER — SODIUM CHLORIDE 9 MG/ML
INJECTION, SOLUTION INTRAVENOUS CONTINUOUS
Status: DISCONTINUED | OUTPATIENT
Start: 2022-01-01 | End: 2022-01-01

## 2022-01-01 RX ORDER — POTASSIUM CHLORIDE 29.8 MG/ML
20 INJECTION INTRAVENOUS PRN
Status: DISCONTINUED | OUTPATIENT
Start: 2022-01-01 | End: 2022-10-13 | Stop reason: HOSPADM

## 2022-01-01 RX ORDER — PHENYLEPHRINE HCL IN 0.9% NACL 50MG/250ML
10-300 PLASTIC BAG, INJECTION (ML) INTRAVENOUS CONTINUOUS
Status: DISCONTINUED | OUTPATIENT
Start: 2022-01-01 | End: 2022-10-13 | Stop reason: HOSPADM

## 2022-01-01 RX ORDER — ONDANSETRON 2 MG/ML
4 INJECTION INTRAMUSCULAR; INTRAVENOUS EVERY 6 HOURS PRN
Status: DISCONTINUED | OUTPATIENT
Start: 2022-01-01 | End: 2022-10-13 | Stop reason: HOSPADM

## 2022-01-01 RX ADMIN — VASOPRESSIN 0.04 UNITS/MIN: 20 INJECTION PARENTERAL at 00:25

## 2022-01-01 RX ADMIN — HEPARIN SODIUM 2000 UNITS: 1000 INJECTION INTRAVENOUS; SUBCUTANEOUS at 06:00

## 2022-01-01 RX ADMIN — SODIUM CHLORIDE 1500 MG: 900 INJECTION INTRAVENOUS at 12:23

## 2022-01-01 RX ADMIN — SODIUM CHLORIDE 1500 MG: 900 INJECTION INTRAVENOUS at 00:22

## 2022-01-01 RX ADMIN — VASOPRESSIN 0.04 UNITS/MIN: 20 INJECTION PARENTERAL at 02:09

## 2022-01-01 RX ADMIN — Medication 50 MCG/HR: at 04:05

## 2022-01-01 RX ADMIN — Medication 55 MCG/MIN: at 10:14

## 2022-01-01 RX ADMIN — MIDAZOLAM HYDROCHLORIDE 6 MG: 5 INJECTION, SOLUTION INTRAMUSCULAR; INTRAVENOUS at 19:48

## 2022-01-01 RX ADMIN — SODIUM CHLORIDE 1500 MG: 900 INJECTION INTRAVENOUS at 13:23

## 2022-01-01 RX ADMIN — SODIUM BICARBONATE: 84 INJECTION, SOLUTION INTRAVENOUS at 21:52

## 2022-01-01 RX ADMIN — SODIUM CHLORIDE 1500 MG: 900 INJECTION INTRAVENOUS at 06:04

## 2022-01-01 RX ADMIN — Medication 20 MCG/MIN: at 15:31

## 2022-01-01 RX ADMIN — Medication 1500 ML/HR: at 16:08

## 2022-01-01 RX ADMIN — HEPARIN SODIUM AND DEXTROSE 12 UNITS/KG/HR: 10000; 5 INJECTION INTRAVENOUS at 11:21

## 2022-01-01 RX ADMIN — Medication 1500 ML/HR: at 11:32

## 2022-01-01 RX ADMIN — SODIUM BICARBONATE 50 MEQ: 84 INJECTION, SOLUTION INTRAVENOUS at 17:35

## 2022-01-01 RX ADMIN — Medication 6 MG/HR: at 14:34

## 2022-01-01 RX ADMIN — LEVETIRACETAM 500 MG: 100 INJECTION INTRAVENOUS at 14:36

## 2022-01-01 RX ADMIN — SODIUM CHLORIDE 1500 MG: 900 INJECTION INTRAVENOUS at 18:14

## 2022-01-01 RX ADMIN — CEFEPIME 2000 MG: 2 INJECTION, POWDER, FOR SOLUTION INTRAVENOUS at 05:24

## 2022-01-01 RX ADMIN — Medication 1500 ML/HR: at 19:41

## 2022-01-01 RX ADMIN — SODIUM CHLORIDE: 9 INJECTION, SOLUTION INTRAVENOUS at 00:18

## 2022-01-01 RX ADMIN — LEVETIRACETAM 1500 MG: 15 INJECTION INTRAVENOUS at 20:11

## 2022-01-01 RX ADMIN — Medication 1500 ML/HR: at 16:07

## 2022-01-01 RX ADMIN — SODIUM CHLORIDE, PRESERVATIVE FREE 40 MG: 5 INJECTION INTRAVENOUS at 08:15

## 2022-01-01 RX ADMIN — Medication 2 MG/HR: at 23:24

## 2022-01-01 RX ADMIN — HEPARIN SODIUM 2000 UNITS: 1000 INJECTION INTRAVENOUS; SUBCUTANEOUS at 07:06

## 2022-01-01 RX ADMIN — SODIUM CHLORIDE 1000 ML: 9 INJECTION, SOLUTION INTRAVENOUS at 05:28

## 2022-01-01 RX ADMIN — VASOPRESSIN 0.04 UNITS/MIN: 20 INJECTION PARENTERAL at 08:16

## 2022-01-01 RX ADMIN — SODIUM CHLORIDE 1500 MG: 900 INJECTION INTRAVENOUS at 06:08

## 2022-01-01 RX ADMIN — MIDAZOLAM HYDROCHLORIDE 6 MG: 2 INJECTION, SOLUTION INTRAMUSCULAR; INTRAVENOUS at 11:51

## 2022-01-01 RX ADMIN — VASOPRESSIN 0.04 UNITS/MIN: 20 INJECTION PARENTERAL at 15:50

## 2022-01-01 RX ADMIN — Medication 1500 ML/HR: at 10:45

## 2022-01-01 RX ADMIN — Medication 100 MCG/MIN: at 14:23

## 2022-01-01 RX ADMIN — HEPARIN SODIUM 2000 UNITS: 1000 INJECTION INTRAVENOUS; SUBCUTANEOUS at 21:33

## 2022-01-01 RX ADMIN — SODIUM CHLORIDE, PRESERVATIVE FREE 10 ML: 5 INJECTION INTRAVENOUS at 20:13

## 2022-01-01 RX ADMIN — Medication 1500 ML/HR: at 04:54

## 2022-01-01 RX ADMIN — SODIUM BICARBONATE: 84 INJECTION, SOLUTION INTRAVENOUS at 11:18

## 2022-01-01 RX ADMIN — SODIUM CHLORIDE, PRESERVATIVE FREE 10 ML: 5 INJECTION INTRAVENOUS at 08:16

## 2022-01-01 RX ADMIN — VASOPRESSIN 0.04 UNITS/MIN: 20 INJECTION PARENTERAL at 07:33

## 2022-01-01 RX ADMIN — Medication 1500 ML/HR: at 11:31

## 2022-01-01 RX ADMIN — Medication 1500 ML/HR: at 04:55

## 2022-01-01 RX ADMIN — SODIUM CHLORIDE 1500 MG: 900 INJECTION INTRAVENOUS at 12:19

## 2022-01-01 RX ADMIN — Medication 100 MCG/HR: at 07:29

## 2022-01-01 RX ADMIN — Medication 1500 ML/HR: at 04:56

## 2022-01-01 RX ADMIN — HEPARIN SODIUM 4000 UNITS: 1000 INJECTION INTRAVENOUS; SUBCUTANEOUS at 11:22

## 2022-01-01 RX ADMIN — CHLORHEXIDINE GLUCONATE 15 ML: 1.2 RINSE ORAL at 22:23

## 2022-01-01 RX ADMIN — VECURONIUM BROMIDE 5 MG: 1 INJECTION, POWDER, LYOPHILIZED, FOR SOLUTION INTRAVENOUS at 19:47

## 2022-01-01 RX ADMIN — SODIUM CHLORIDE, PRESERVATIVE FREE 10 ML: 5 INJECTION INTRAVENOUS at 20:50

## 2022-01-01 RX ADMIN — SODIUM CHLORIDE 1500 MG: 900 INJECTION INTRAVENOUS at 00:58

## 2022-01-01 RX ADMIN — Medication 1500 ML/HR: at 11:30

## 2022-01-01 RX ADMIN — CHLORHEXIDINE GLUCONATE 15 ML: 1.2 RINSE ORAL at 20:50

## 2022-01-01 RX ADMIN — Medication 2 MG/HR: at 12:11

## 2022-01-01 RX ADMIN — SODIUM CHLORIDE: 9 INJECTION, SOLUTION INTRAVENOUS at 06:01

## 2022-01-01 RX ADMIN — CHLORHEXIDINE GLUCONATE 15 ML: 1.2 RINSE ORAL at 09:00

## 2022-01-01 RX ADMIN — CALCIUM GLUCONATE 1000 MG: 20 INJECTION, SOLUTION INTRAVENOUS at 22:20

## 2022-01-01 RX ADMIN — Medication 50 MCG/HR: at 00:51

## 2022-01-01 RX ADMIN — Medication 35 MCG/MIN: at 02:09

## 2022-01-01 RX ADMIN — VASOPRESSIN 0.04 UNITS/MIN: 20 INJECTION PARENTERAL at 11:52

## 2022-01-01 RX ADMIN — SODIUM CHLORIDE, PRESERVATIVE FREE 40 MG: 5 INJECTION INTRAVENOUS at 10:54

## 2022-01-01 RX ADMIN — HEPARIN SODIUM AND DEXTROSE 6 UNITS/KG/HR: 10000; 5 INJECTION INTRAVENOUS at 18:46

## 2022-01-01 RX ADMIN — Medication 1500 ML/HR: at 14:05

## 2022-01-01 RX ADMIN — Medication 1500 ML/HR: at 14:06

## 2022-01-01 RX ADMIN — SODIUM CHLORIDE: 9 INJECTION, SOLUTION INTRAVENOUS at 08:17

## 2022-01-01 RX ADMIN — SODIUM BICARBONATE: 84 INJECTION, SOLUTION INTRAVENOUS at 22:04

## 2022-01-01 RX ADMIN — Medication 100 MCG/MIN: at 17:25

## 2022-01-01 RX ADMIN — SODIUM CHLORIDE, PRESERVATIVE FREE 40 MG: 5 INJECTION INTRAVENOUS at 20:12

## 2022-01-01 RX ADMIN — SODIUM BICARBONATE: 84 INJECTION, SOLUTION INTRAVENOUS at 09:56

## 2022-01-01 RX ADMIN — Medication 1500 ML/HR: at 19:44

## 2022-01-01 RX ADMIN — HEPARIN SODIUM AND DEXTROSE 12 UNITS/KG/HR: 10000; 5 INJECTION INTRAVENOUS at 10:49

## 2022-01-01 RX ADMIN — Medication 1500 ML/HR: at 19:43

## 2022-01-01 RX ADMIN — Medication 1500 ML/HR: at 14:07

## 2022-01-01 RX ADMIN — HEPARIN SODIUM AND DEXTROSE 8 UNITS/KG/HR: 10000; 5 INJECTION INTRAVENOUS at 21:33

## 2022-01-01 RX ADMIN — SODIUM CHLORIDE, PRESERVATIVE FREE 10 ML: 5 INJECTION INTRAVENOUS at 10:57

## 2022-01-01 RX ADMIN — Medication 16 MCG/MIN: at 00:26

## 2022-01-01 RX ADMIN — SODIUM CHLORIDE, PRESERVATIVE FREE 40 MG: 5 INJECTION INTRAVENOUS at 09:33

## 2022-01-01 RX ADMIN — Medication 50 MEQ: at 17:35

## 2022-01-01 RX ADMIN — Medication 15 MCG/MIN: at 13:07

## 2022-01-01 RX ADMIN — VASOPRESSIN 0.04 UNITS/MIN: 20 INJECTION PARENTERAL at 16:41

## 2022-01-01 RX ADMIN — CALCIUM GLUCONATE 2000 MG: 20 INJECTION, SOLUTION INTRAVENOUS at 05:55

## 2022-01-01 RX ADMIN — Medication 15 MCG/MIN: at 03:37

## 2022-01-01 RX ADMIN — SODIUM CHLORIDE, PRESERVATIVE FREE 30 ML: 5 INJECTION INTRAVENOUS at 09:32

## 2022-01-01 RX ADMIN — Medication 1500 ML/HR: at 16:09

## 2022-01-01 RX ADMIN — PHENTOLAMINE MESYLATE 5 MG: 5 INJECTION INTRAMUSCULAR; INTRAVENOUS at 11:48

## 2022-01-01 RX ADMIN — Medication 1500 ML/HR: at 02:10

## 2022-01-01 RX ADMIN — SODIUM CHLORIDE 1500 MG: 900 INJECTION INTRAVENOUS at 18:33

## 2022-01-01 RX ADMIN — SODIUM BICARBONATE: 84 INJECTION, SOLUTION INTRAVENOUS at 08:40

## 2022-01-01 RX ADMIN — Medication 250 ML: at 05:01

## 2022-01-01 RX ADMIN — Medication 10 MCG/MIN: at 14:01

## 2022-01-01 ASSESSMENT — PULMONARY FUNCTION TESTS
PIF_VALUE: 28
PIF_VALUE: 29
PIF_VALUE: 29
PIF_VALUE: 28
PIF_VALUE: 45
PIF_VALUE: 30
PIF_VALUE: 33
PIF_VALUE: 21
PIF_VALUE: 27
PIF_VALUE: 26
PIF_VALUE: 28
PIF_VALUE: 27
PIF_VALUE: 29
PIF_VALUE: 27
PIF_VALUE: 36
PIF_VALUE: 29
PIF_VALUE: 27
PIF_VALUE: 28
PIF_VALUE: 31
PIF_VALUE: 30
PIF_VALUE: 31
PIF_VALUE: 28
PIF_VALUE: 27
PIF_VALUE: 30
PIF_VALUE: 27
PIF_VALUE: 27
PIF_VALUE: 28
PIF_VALUE: 25
PIF_VALUE: 28
PIF_VALUE: 29
PIF_VALUE: 36
PIF_VALUE: 25
PIF_VALUE: 26
PIF_VALUE: 32
PIF_VALUE: 27
PIF_VALUE: 30
PIF_VALUE: 32
PIF_VALUE: 27
PIF_VALUE: 32
PIF_VALUE: 27
PIF_VALUE: 29
PIF_VALUE: 29
PIF_VALUE: 30
PIF_VALUE: 29
PIF_VALUE: 26
PIF_VALUE: 29
PIF_VALUE: 30
PIF_VALUE: 28
PIF_VALUE: 25
PIF_VALUE: 28
PIF_VALUE: 28
PIF_VALUE: 26
PIF_VALUE: 39
PIF_VALUE: 28
PIF_VALUE: 18
PIF_VALUE: 24
PIF_VALUE: 29
PIF_VALUE: 30
PIF_VALUE: 24
PIF_VALUE: 30
PIF_VALUE: 29
PIF_VALUE: 25
PIF_VALUE: 27
PIF_VALUE: 25
PIF_VALUE: 58
PIF_VALUE: 29
PIF_VALUE: 27
PIF_VALUE: 29
PIF_VALUE: 26
PIF_VALUE: 27
PIF_VALUE: 29
PIF_VALUE: 29
PIF_VALUE: 26
PIF_VALUE: 27
PIF_VALUE: 31
PIF_VALUE: 25
PIF_VALUE: 36
PIF_VALUE: 29
PIF_VALUE: 30
PIF_VALUE: 28
PIF_VALUE: 29
PIF_VALUE: 31
PIF_VALUE: 28
PIF_VALUE: 29
PIF_VALUE: 28
PIF_VALUE: 27
PIF_VALUE: 26
PIF_VALUE: 26
PIF_VALUE: 27
PIF_VALUE: 34
PIF_VALUE: 28
PIF_VALUE: 28
PIF_VALUE: 30
PIF_VALUE: 26
PIF_VALUE: 25
PIF_VALUE: 25
PIF_VALUE: 31
PIF_VALUE: 28
PIF_VALUE: 25
PIF_VALUE: 25
PIF_VALUE: 15

## 2022-10-10 PROBLEM — E87.5 HYPERKALEMIA: Status: ACTIVE | Noted: 2022-01-01

## 2022-10-10 PROBLEM — I46.9 CARDIAC ARREST (HCC): Status: ACTIVE | Noted: 2022-01-01

## 2022-10-10 PROBLEM — E87.20 METABOLIC ACIDOSIS: Status: ACTIVE | Noted: 2022-01-01

## 2022-10-10 NOTE — H&P
Critical Care - History and Physical Examination    Patient's name:  Jesus Alberto Monreal Record Number: [id number]  Patient's account/billing number: [de-identified]  Patient's YOB: 1960  Age: 60 y.o. Date of Admission: 10/10/2022  3:21 AM  Date of History and Physical Examination: 10/10/2022      Primary Care Physician: Cirilo Mahoney  Attending Physician: Miladis Moses MD    Code Status: Full Code    Chief complaint:   Chief Complaint   Patient presents with    Cardiac Arrest     Arrives per LF4 from scene. Per LF, pt was cardiac arrest per EMS. Pt with ROSC and transported to [facility] to be transported to [address] [place] [address] Pt received 2 rounds of epi, unknown amounts of fentanyl and versed. Pt received 1 amp Bicarb and 1 amp calcium chloride. Pt arrives intubated, no sedation. HISTORY OF PRESENT ILLNESS:      History was obtained from unobtainable from patient due to intubated and history obtained through chart review and ED signout    Dana Alexandre is a 60 y.o. with PMH of     -CAD s/p CABG in 1990, recent thrombectomy in cardiac catheterization in 2021  -CKD stage IV currently on dialysis dependent  -Essential hypertension, dyslipidemia  -Type 1 diabetes on insulin pump  -Recent cardiac arrest on 10/1/2022 currently has implanted LifeVest on    Presented to [facility] ED postcardiac arrest.  History is very limited    Patient was apparently had cardiac arrest unknown whether it is a V. fib or PEA arrest was found by EMS did had 2 rounds of epi with a bicarb and ROSC was achieved patient was subsequently brought to [facility] ED by  air ambulance. ED does not know whether the patient has V. fib or PEA arrest.  In route to [facility] ED patient also received 4 epi doses for keeping his blood pressure systolic greater than 90.     As per history from ED signout patient has new tunneled dialysis catheter placed on 10/6/2022 for getting dialysis concern patient has missed his recent hemodialysis. Initial evaluation in the ED patient was intubated for airway support and currently on Levophed and fentanyl. As per ED patient has intact pupil, cough and gag and is on fentanyl for sedation    Lab work-up showed WBC of 26.6, hemoglobin of 8.6, lactic acid of 6.2, VBG of 7.212, PCO2 of 44.1, PO2 of 18.5, bicarb of 17.7, sodium of 127, potassium of 5.6 currently 100% FiO2 with saturating around 86-88. EKG reviewed by cardiology with no concerns for STEMI. Critical care was consulted for admission to ICU postcardiac arrest with hypoxic respiratory failure requiring mechanical ventilation. PAST MEDICAL HISTORY:         Diagnosis Date    Chronic kidney disease     CKD (chronic kidney disease)     Diabetes (HCC)     Hyperlipidemia     Hypertension     Ischemic heart disease     MRSA (methicillin resistant staph aureus) culture positive     Neuropathy     Sleep apnea          PAST SURGICAL HISTORY:         Procedure Laterality Date    APPENDECTOMY      CARDIAC CATHETERIZATION      EYE SURGERY      TRIGGER FINGER RELEASE         ALLERGIES:      No Known Allergies      HOME MEDS: :      Prior to Admission medications    Medication Sig Start Date End Date Taking?  Authorizing Provider   Cholecalciferol (VITAMIN D3) 2000 units CAPS Take by mouth every other day    Historical Provider, MD   sodium polystyrene (SPS) 15 GM/60ML suspension Take 60 mLs by mouth once for 1 dose 6/15/17 6/15/17  Lizandro Johnson MD   aspirin 81 MG tablet Take 81 mg by mouth daily    Historical Provider, MD   ascorbic acid (VITAMIN C) 500 MG tablet Take 500 mg by mouth daily    Historical Provider, MD   ergocalciferol (DRISDOL) 14339 UNITS capsule Take 1 capsule by mouth once a week For 8 weeks, then 1 capsule by mouth once a month for 4 months 1/11/17   Lizandro Johnson MD   Aspirin Buf,YlBoy-FfDkb-PoHod, (BUFFERED ASPIRIN) 325 MG TABS Take 325 mg by mouth daily    Historical Provider, MD   atorvastatin (LIPITOR) 40 MG tablet Take 40 mg by mouth daily    Historical Provider, MD   furosemide (LASIX) 40 MG tablet Take 40 mg by mouth daily    Historical Provider, MD   insulin lispro (HUMALOG) 100 UNIT/ML injection vial Inject into the skin Pt has a pump    Historical Provider, MD   hydrALAZINE (APRESOLINE) 50 MG tablet Take 50 mg by mouth Takes one tablet in am and two tablets in evening    Historical Provider, MD   isosorbide mononitrate (IMDUR) 60 MG CR tablet Take 60 mg by mouth daily    Historical Provider, MD   insulin glargine (LANTUS) 100 UNIT/ML injection vial Inject 20 Units into the skin nightly    Historical Provider, MD   lisinopril (PRINIVIL;ZESTRIL) 5 MG tablet Take 2.5 mg by mouth daily     Historical Provider, MD   Multiple Vitamins-Minerals (THERAPEUTIC MULTIVITAMIN-MINERALS) tablet Take 1 tablet by mouth daily    Historical Provider, MD   nitroGLYCERIN (NITROSTAT) 0.4 MG SL tablet Place 0.4 mg under the tongue every 5 minutes as needed for Chest pain    Historical Provider, MD   clopidogrel (PLAVIX) 75 MG tablet Take 75 mg by mouth daily    Historical Provider, MD       SOCIAL HISTORY:       TOBACCO:   reports that he has quit smoking. He has never used smokeless tobacco.  ETOH:   reports no history of alcohol use. DRUGS:  has no history on file for drug use.   OCCUPATION:       FAMILY HISTORY:          Problem Relation Age of Onset    Stroke Mother     Diabetes Mother     Other Mother         pace maker    Heart Disease Father        REVIEW OF SYSTEMS (ROS):     Review of Systems   Unable to perform ROS: Intubated       OBJECTIVE:     VITAL SIGNS:  /79   Pulse (P) 62   Temp (P) 97.5 °F (36.4 °C) (Bladder)   Resp 18   Ht 5' 7\" (1.702 m)   Wt 165 lb (74.8 kg)   SpO2 96%   BMI 25.84 kg/m²   Tmax over 24 hours:  Temp (24hrs), Av.5 °F (36.4 °C), Min:97.5 °F (36.4 °C), Max:97.5 °F (36.4 °C)      Patient Vitals for the past 8 hrs:   BP Temp Temp src Pulse Resp SpO2 Height Weight   10/10/22 0800 -- (P) 97.5 °F (36.4 °C) (P) Bladder (P) 62 -- -- -- --   10/10/22 0728 -- -- -- 62 18 -- -- --   10/10/22 0722 -- -- -- 62 17 96 % -- --   10/10/22 0657 -- -- -- 67 (!) 32 100 % -- --   10/10/22 0558 122/79 -- -- 59 18 -- -- --   10/10/22 0556 120/78 -- -- 59 18 95 % -- --   10/10/22 0506 116/75 -- -- 58 18 98 % -- --   10/10/22 0459 119/77 -- -- 58 18 90 % -- --   10/10/22 0420 115/76 -- -- 58 15 (!) 88 % -- --   10/10/22 0406 119/70 -- -- (!) 49 19 90 % -- --   10/10/22 0353 118/72 -- -- 57 20 100 % -- --   10/10/22 0350 89/61 -- -- 56 18 100 % -- --   10/10/22 0345 85/60 -- -- 57 20 (!) 85 % 5' 7\" (1.702 m) --   10/10/22 0336 91/62 -- -- (!) 47 20 92 % -- --   10/10/22 0333 106/75 -- -- (!) 39 22 90 % -- --   10/10/22 0323 -- -- -- -- 18 100 % -- --   10/10/22 0315 (!) 126/96 97.5 °F (36.4 °C) Rectal 59 20 100 % -- 165 lb (74.8 kg)       No intake or output data in the 24 hours ending 10/10/22 0919    Wt Readings from Last 3 Encounters:   10/10/22 165 lb (74.8 kg)   02/21/20 172 lb (78 kg)   10/11/19 177 lb (80.3 kg)     Body mass index is 25.84 kg/m². PHYSICAL EXAM:  Physical Exam  Constitutional:       Comments: Patient is intubated   Cardiovascular:      Rate and Rhythm: Normal rate. Pulses: Normal pulses. Pulmonary:      Effort: Pulmonary effort is normal.      Comments: Ventilatory breath sounds  Abdominal:      General: Abdomen is flat. Skin:     Coloration: Skin is pale.    Neurological:      Comments: Unable to ascess intubated          MEDICATIONS:  Scheduled Meds:   sodium chloride flush  5-40 mL IntraVENous 2 times per day    pantoprazole (PROTONIX) 40 mg injection  40 mg IntraVENous Daily    vancomycin  15 mg/kg IntraVENous Once    sodium zirconium cyclosilicate  10 g Oral Once    ampicillin-sulbactam  1,500 mg IntraVENous Q6H    heparin (porcine)  4,000 Units IntraVENous Once    atropine        EPINEPHrine        phentolamine  5 mg SubCUTAneous Once     Continuous Infusions:   fentaNYL 50 mcg/mL 50 mcg/hr (10/10/22 0405)    norepinephrine 30 mcg/min (10/10/22 0343)    sodium chloride 10 mL/hr at 10/10/22 0817    heparin (PORCINE) Infusion      vasopressin (Septic Shock) infusion 0.04 Units/min (10/10/22 0816)    midazolam      CRRT dialysis builder      sodium bicarbonate infusion       PRN Meds:   sodium chloride flush, 5-40 mL, PRN  sodium chloride, , PRN  ondansetron, 4 mg, Q8H PRN   Or  ondansetron, 4 mg, Q6H PRN  polyethylene glycol, 17 g, Daily PRN  acetaminophen, 650 mg, Q6H PRN   Or  acetaminophen, 650 mg, Q6H PRN  heparin (porcine), 4,000 Units, PRN  heparin (porcine), 2,000 Units, PRN  potassium chloride, 20 mEq, PRN  magnesium sulfate, 1,000 mg, PRN  calcium gluconate, 1,000 mg, PRN   Or  calcium gluconate, 2,000 mg, PRN   Or  calcium gluconate, 3,000 mg, PRN   Or  calcium gluconate, 4,000 mg, PRN  sodium phosphate IVPB, 6 mmol, PRN   Or  sodium phosphate IVPB, 12 mmol, PRN   Or  sodium phosphate IVPB, 18 mmol, PRN   Or  sodium phosphate IVPB, 24 mmol, PRN        ABGs:   Lab Results   Component Value Date/Time    FIO2 100.0 10/10/2022 05:12 AM       DATA:  Complete Blood Count:   Recent Labs     10/10/22  0342 10/10/22  0759   WBC 26.6* 33.1*   RBC 3.07* 2.91*   HGB 8.6* 8.2*   HCT 27.5* 25.3*   MCV 89.6 86.9   MCH 28.0 28.2   MCHC 31.3 32.4   RDW 15.8* 15.6*    347   MPV 10.9 10.7        Last 3 Blood Glucose:   Recent Labs     10/10/22  0342 10/10/22  0759   GLUCOSE 115* 164*        PT/INR:    Lab Results   Component Value Date/Time    PROTIME 11.9 10/10/2022 03:42 AM    INR 1.2 10/10/2022 03:42 AM     PTT:    Lab Results   Component Value Date/Time    APTT 21.9 10/10/2022 08:44 AM       Comprehensive Metabolic Profile:   Recent Labs     10/10/22  0342 10/10/22  0512 10/10/22  0759   *  --  126*   K 5.8*  --  4.9   CL 91*  --  93*   CO2 17*  --  16*   BUN 61*  --  63*   CREATININE 4.60* 4.51* 4.30*   GLUCOSE 115*  --  164*   CALCIUM 8.5*  --  8.5*   PROT 5.5*  --   --    LABALBU 3.3* --   --    BILITOT 0.4  --   --    ALKPHOS 204*  --   --    *  --   --    *  --   --       Magnesium:   Lab Results   Component Value Date/Time    MG 2.2 10/10/2022 07:59 AM     Phosphorus:   Lab Results   Component Value Date/Time    PHOS 6.4 10/10/2022 07:59 AM    PHOS 3.8 06/02/2020 12:00 AM    PHOS 4.5 01/18/2016 12:00 AM     Ionized Calcium:   Lab Results   Component Value Date/Time    CAION 1.16 10/10/2022 08:44 AM        Urinalysis:   Lab Results   Component Value Date/Time    NITRU NEGATIVE 10/10/2022 03:52 AM    COLORU Dark Yellow 10/10/2022 03:52 AM    PHUR 5.0 10/10/2022 03:52 AM    WBCUA 0 TO 2 10/10/2022 03:52 AM    RBCUA 2 TO 5 10/10/2022 03:52 AM    SPECGRAV 1.022 10/10/2022 03:52 AM    LEUKOCYTESUR TRACE 10/10/2022 03:52 AM    UROBILINOGEN Normal 10/10/2022 03:52 AM    BILIRUBINUR NEGATIVE  Verified by ictotest. 10/10/2022 03:52 AM    GLUCOSEU NEGATIVE 10/10/2022 03:52 AM    KETUA TRACE 10/10/2022 03:52 AM       HgBA1c:  No results found for: LABA1C  TSH:    Lab Results   Component Value Date/Time    TSH 6.38 10/10/2022 03:42 AM       Lactic Acid: No results found for: LACTA   Troponin: No results for input(s): TROPONINI in the last 72 hours. Electrocardiogram:       Last Echocardiogram findings:   (See actual reports for details)      Radiological imaging  No results found. ASSESSMENT:     Principal Problem:    Cardiac arrest [facility])  Resolved Problems:    * No resolved hospital problems. *      PLAN:     ICU PROPHYLAXIS:  Stress ulcer:  [x] PPI Agent  [] [id number] [] Sucralfate  [] Other:  VTE:   [] Enoxaparin  [x] Unfract.  Heparin Subcut  [] EPC Cuffs    NUTRITION:  [x] NPO  [] Tube Feeding (Specify: ) [] TPN  [] PO    CONSULTATION NEEDED:  [] No   [x] Yes     HOME MEDICATIONS RECONCILED: [x] No  [] Yes    FAMILY UPDATED:    [x] No   [] Yes     ADDITIONAL PLAN:      PLAN/MEDICAL DECISION MAKING:    Post cardiac arrest unknown V. fib/PEA arrest  -Continue on IV pressor support, fentanyl for sedation  Cardiology on board  -Start patient on Unasyn, vancomycin blood culture to obtain before antibiotic  -Follow-up on CBC, BMP  -Hypothermia protocol      Hypoxic respiratory failure requiring intubation mechanical ventilation  -ABG with vent changes  -Pulmonary toilet  -Fentanyl, Versed to continue for sedation    Patient dialysis dependent with underlying CKD stage IV  -Nephrology consult    Type 1 diabetes on insulin pump   -Hold on insulin pump, monitor blood glucose every 2    To remain in ICU      CODE STATUS: Full Code       Scott Cool MD  Internal Medicine Resident- PGY [id number] [facility]  10/10/2022 9:19 AM    Attending Physician Statement  I have discussed the care of Kait Azevedo, including pertinent history and exam findings,  with the resident. I have seen and examined the patient and the key elements of all parts of the encounter have been performed by me. I agree with the assessment, plan and orders as documented by the resident with additions . Cardiorespiratory arrest   Acute on chr systolic CHF   Cad with CABG hx and recent cath non viable heart muscle and no intervention . Hypothermia protocol   ESRD on hd - now cvvhd   Cardiogenic shock   D/w wife ,son - advised [facility]    Total critical care time caring for this patient with life threatening, unstable organ failure, including direct patient contact, management of life support systems, review of data including imaging and labs, discussions with other team members and physicians at least 36 Min so far today, excluding procedures. Electronically signed by Kimberley Arcos MD on   10/10/22 at 7:40 PM EDT    Please note that this chart was generated using voice recognition Dragon dictation software. Although every effort was made to ensure the accuracy of this automated transcription, some errors in transcription may have occurred.    The critical care team assigned to the patient will be following up the patient in the intensive care unit. I have discussed the current plan with the critical care attending. The above mentioned assessment and plan will be reviewed again in detail by the critical care attending at bedside, and can be further changed or modified accordingly by the attending physician.

## 2022-10-10 NOTE — CARE COORDINATION
10/10/22 1018   Service Assessment   Patient Orientation Other (see comment)  (Intubated)   Cognition Other (see comment)  (unable to assess, family states he was alert and oriented)   History Provided By Spouse; Other (see comment)  (and sister-in-law, Karlo Ambriz)   Primary Caregiver Self   Support Systems Spouse/Significant Other   Patient's Healthcare Decision Maker is: Legal Next of Jelly 68   PCP Verified by CM Yes   Last Visit to PCP Within last 3 months   Prior Functional Level Assistance with the following:;Dressing   Current Functional Level Assistance with the following:;Bathing;Dressing; Toileting;Mobility   Can patient return to prior living arrangement Unknown at present   Ability to make needs known: Unable   Family able to assist with home care needs: Other (comment)  (Spouse is in wheelchair for left knee, but drives pt around)   Would you like for me to discuss the discharge plan with any other family members/significant others, and if so, who? Yes   Social/Functional History   Lives With Spouse;Son;Other (comment)  (2 sons.)   Type of [address] [facility] One level   Home Access Ramped entrance   [address]   Bathroom Toilet Handicap height   [address] [facility] transfer bench   Bathroom Accessibility Wheelchair accessible   [address] [facility]   Homemaking Responsibilities Yes   Active  No   Patient's  Info not since 2008, spouse drives him, she is in wheelchair for her left knee which needs surgery. Occupation Retired   Type of Occupation was a    Discharge Planning   Type of Residence Skilled Nursing Facility;Acute Rehab;Long-Term [address] Spouse/Significant Other;Children   Current Services Prior To Admission Durable Medical Equipment   Current DME Prior to [facility]   DME Ordered? Ovi Plummer; Wheelchair   Potential Assistance Purchasing Medications No   Type of Home Care Services None   One/Two Story Residence One story   History of falls? [address] Provided? No   Condition of Participation: Discharge Planning   Freedom of Choice list was provided with basic dialogue that supports the patient's individualized plan of care/goals, treatment preferences, and shares the quality data associated with the providers? No   Spouse at bedside in w/c with sister-in-law. She is pt's primary . House is handicapped accessible with 2 ramps and multiple walkers and 2 w/c's per pt. Pt was not using any DME prior to arrival.   Type 1 diabetic. Spouse states he is always walking into things and always has wounds on his lower legs. Spouse said he fell in the bathroom last week and hit his head, had to lay there for awhile before he could get up. She did not see any visible lacerations on head. Added 2 emergency contacts.

## 2022-10-10 NOTE — CONSULTS
Nephrology Consult Note    Reason for Consult:  dialysis dependent renal failure, metabolic acidosis and hyperkalemia  Requesting Physician:  Guerda Simms    Chief Complaint:  patient unable voice a chief complaint intubated and on the ventilator  History Obtained From:  electronic medical record    History of Present Illness: This is a 60 y.o. male who presents with status post cardiac arrest per EMS and came in via [employer]. He came from [facility].  Apparently received epinephrine fentanyl and Versed along with sodium bicarbonate and calcium chloride postcardiac arrest.  He came to hospital intubated and without sedation. The patient has a known history of diabetes mellitus and apparently has an insulin pump. He is on dialysis. He has seen my partner, Dr. You Campuzano, in the past.  I'm not sure where the patient does his dialysis but it may be at [facility]. His past medical history includes CAD status post CABG in 1990 with documented recent thrombectomy during cardiac catheterization in 2020. According to cardiology notes, he was recently discharged from the hospital after he presented there with a bradycardic arrest.  He did undergo cardiac catheterization and was discharged on a lifevest with LVEF 25-30%. The patient did have a central line placed this morning just prior to my evaluation. The patient is on Levophed and vasopressin. With the patient on vasopressors and the patient's hyperkalemia and metabolic acidosis and recent presentation with cardiac arrest, we will initiate CVVHD. Other diagnoses the patient carries include hyperlipidemia, hypertension, MRSA, neuropathy and sleep apnea and he has had an appendectomy and eye surgery trigger finger release surgery and CABG and cardiac catheterization previously. Medications are reviewed. No known drug allergies. Patient is  and is a former cigarette smoker.     Past Medical History:        Diagnosis Date    Chronic kidney disease     CKD (chronic kidney disease)     Diabetes ([facility] [address]     Hyperlipidemia     Hypertension     Ischemic heart disease     MRSA (methicillin resistant staph aureus) culture positive     Neuropathy     Sleep apnea        Past Surgical History:        Procedure Laterality Date    APPENDECTOMY      CARDIAC CATHETERIZATION      EYE SURGERY      TRIGGER FINGER RELEASE         Current Medications:    fentaNYL 50 mcg/mL 50 mL infusion, Continuous  norepinephrine (LEVOPHED) 16 mg in sodium chloride 0.9 % 250 mL infusion, Continuous  sodium chloride flush 0.9 % injection 5-40 mL, 2 times per day  sodium chloride flush 0.9 % injection 5-40 mL, PRN  0.9 % sodium chloride infusion, PRN  ondansetron (ZOFRAN-ODT) disintegrating tablet 4 mg, Q8H PRN   Or  ondansetron (ZOFRAN) injection 4 mg, Q6H PRN  polyethylene glycol (GLYCOLAX) packet 17 g, Daily PRN  acetaminophen (TYLENOL) tablet 650 mg, Q6H PRN   Or  acetaminophen (TYLENOL) suppository 650 mg, Q6H PRN  pantoprazole (PROTONIX) 40 mg in sodium chloride (PF) 10 mL injection, Daily  vancomycin (VANCOCIN) 1000 mg in sodium chloride 0.9% 250 mL IVPB, Once  sodium zirconium cyclosilicate (LOKELMA) oral suspension 10 g, Once  ampicillin-sulbactam (UNASYN) 1500 mg in 50 mL NS IVPB minibag, Q6H  heparin (porcine) injection 4,000 Units, Once  heparin (porcine) injection 4,000 Units, PRN  heparin (porcine) injection 2,000 Units, PRN  [Held by provider] heparin 25,000 units in dextrose 5 % 250 mL infusion (rate based), Continuous  atropine 1 MG/10ML injection,   EPINEPHrine 1 MG/ML injection,   vasopressin 20 Units in dextrose 5 % 100 mL infusion, Continuous  midazolam (VERSED) 1 mg/mL in D5W infusion, Continuous  phentolamine (REGITINE) injection 5 mg, Once        Allergies:  Patient has no known allergies.     Social History:   Social History     Socioeconomic History    Marital status:      Spouse name: Not on file    Number of children: Not on file Years of education: Not on file    Highest education level: Not on file   Occupational History    Not on file   Tobacco Use    Smoking status: Former    Smokeless tobacco: Never   Substance and Sexual Activity    Alcohol use: No    Drug use: Not on file    Sexual activity: Not on file   Other Topics Concern    Not on file   Social History Narrative    Not on file     Social Determinants of Health     Financial Resource Strain: Not on file   Food Insecurity: Not on file   Transportation Needs: Not on file   Physical Activity: Not on file   Stress: Not on file   Social Connections: Not on file   Intimate Partner Violence: Not on file   Housing Stability: Not on file       Family History:   Family History   Problem Relation Age of Onset    Stroke Mother     Diabetes Mother     Other Mother         pace maker    Heart Disease Father        Review of Systems:    Patient is unable to complete her review of systems, status post cardiac arrest and intubated and on the ventilator.     Objective:  CURRENT TEMPERATURE:  Temp: (P) 97.5 °F (36.4 °C)  MAXIMUM TEMPERATURE OVER 24HRS:  Temp (24hrs), Av.5 °F (36.4 °C), Min:97.5 °F (36.4 °C), Max:97.5 °F (36.4 °C)    CURRENT RESPIRATORY RATE:  Resp: 18  CURRENT PULSE:  Heart Rate: (P) 62  CURRENT BLOOD PRESSURE:  BP: 122/79  24HR BLOOD PRESSURE RANGE:  Systolic (44EKZ), VSQ:506 , Min:85 , PXO:032   ; Diastolic (44YOQ), ETU:42, Min:60, Max:96    24HR INTAKE/OUTPUT:  No intake or output data in the 24 hours ending 10/10/22 0908    Physical Exam:  General appearance:intubated and on the ventilator and not responding to questions or commands at this time  Skin: warm and dry, no rash or erythema   ENT: oral endotracheal tube in place  Neck: no particular JVD, midline trachea no accessory muscle use   Pulmonary: Bilateral air entry with equal breath sounds, on the ventilator  Cardiovascular: regular rate and rhythm with positive S1 and S2  Abdomen: non-distended,  no ascites  Extremities: no cyanosis, clubbing or edema    Labs:   CBC:   Recent Labs     10/10/22  0342 10/10/22  0759   WBC 26.6* 33.1*   RBC 3.07* 2.91*   HGB 8.6* 8.2*   HCT 27.5* 25.3*   MCV 89.6 86.9   MCH 28.0 28.2   MCHC 31.3 32.4   RDW 15.8* 15.6*    347   MPV 10.9 10.7      BMP:   Recent Labs     10/10/22  0342 10/10/22  0512 10/10/22  0759   *  --  126*   K 5.8*  --  4.9   CL 91*  --  93*   CO2 17*  --  16*   BUN 61*  --  63*   CREATININE 4.60* 4.51* 4.30*   GLUCOSE 115*  --  164*   CALCIUM 8.5*  --  8.5*        Phosphorus:    Recent Labs     10/10/22  0759   PHOS 6.4*     Magnesium:   Recent Labs     10/10/22  0759   MG 2.2     Albumin:   Recent Labs     10/10/22  0342   LABALBU 3.3*         SPEP:   Lab Results   Component Value Date/Time    PROT 5.5 10/10/2022 03:42 AM     UPEP: No results found for: TPU     C3: No results found for: C3  C4: No results found for: C4  MPO ANCA:  No results found for: MPO . PR3 ANCA:  No results found for: PR3  Urine Sodium:  No results found for: ROWENA   Urine Potassium:  No results found for: KUR  Urine Chloride:  No results found for: CLU  Urine Ph:  No components found for: PO4U  Urine Osmolarity:  No results found for: OSMOU  Urine Creatinine:  No results found for: LABCREA  Urine Eosinophils: No components found for: EOSU  Urine Protein:  No results found for: TPU  Urinalysis:  U/A:   Lab Results   Component Value Date/Time    NITRU NEGATIVE 10/10/2022 03:52 AM    COLORU Dark Yellow 10/10/2022 03:52 AM    PHUR 5.0 10/10/2022 03:52 AM    WBCUA 0 TO 2 10/10/2022 03:52 AM    RBCUA 2 TO 5 10/10/2022 03:52 AM    SPECGRAV 1.022 10/10/2022 03:52 AM    LEUKOCYTESUR TRACE 10/10/2022 03:52 AM    UROBILINOGEN Normal 10/10/2022 03:52 AM    BILIRUBINUR NEGATIVE  Verified by ictotest. 10/10/2022 03:52 AM    GLUCOSEU NEGATIVE 10/10/2022 03:52 AM    KETUA TRACE 10/10/2022 03:52 AM         Radiology:  Reviewed as available. Assessment:  1.  Dialysis dependent renal failure, does utilize outpatient dialysis, possibly at [facility] and possibly under Dr. Desire Christianson, my partner  2. Cardiogenic shock, vasopressor dependent  3. Status post out-of-hospital cardiac arrest   4. Hyperkalemia   5. Metabolic acidosis  6. Hyponatremia   7. Ventilator dependent acute respiratory failure       Plan:  1. CVVHD via right IJ tunneled dialysis catheter     2. Wean vasopressors as tolerated  3. I have personally placed the patient's CVVHD orders into the record  4. Target fluid removal to start it is even  5. Bicarbonate-containing IV fluids to run pre-filter to help with the acidosis    Thank you for the consultation. Please do not hesitate to call with questions.     Electronically signed by Laurie Daniels MD on 10/10/2022 at 9:08 AM

## 2022-10-10 NOTE — CONSULTS
Attestation signed by      Attending Physician Statement:    I have discussed the care of  Rigo Wynn. , including pertinent history and exam findings, with the Cardiology fellow/resident. I have seen and examined the patient and the key elements of all parts of the encounter have been performed by me. I agree with the assessment, plan and orders as documented by the fellow/resident, after I modified exam findings and plan of treatments, and the final version is my approved version of the assessment. Additional Comments: Recent hospitalization at [facility] for bradycardiac arrest. Cardiac cath-MV CAD LIMA-LAD patent. LM-LCX stent patent. OM1 occluded. OM2 patent stent. OM3 filled by collaterals from LAD. LVEF 25-30% and was discharged on Lifevest. Now presented with cardiac arrest. No report of shockable rhythm. Pateint is dialysis dependent. Continue supportive treatment. Will needed dual chamber ICD prior to discharge. Lillie Gillis MD           [facility]    Consult / H&P               Today's Date: 10/10/2022  Patient Name: Rigo Wynn. Date of admission: 10/10/2022  3:21 AM  Patient's age: 60 y.o., 1960  Admission Dx: Cardiac arrest [facility]) [I46.9]    Reason for Consult:  Cardiac evaluation    Requesting Physician: Lilly Rodriguez MD    CHIEF COMPLAINT:  cardiac arrest    History Obtained From:  electronic medical record    HISTORY OF PRESENT ILLNESS:      The patient is a 60 y.o. M h/o ckd, dm, htn, hld, cad s/p cabg 1998 multiple stents occluded vein grafts-most recent laser atherectomy & brachy therapy at [facility] to L-circumflex and OM2 artery 2/2 recurrent in-stent thrombosis (4/6/21) & w/ recent cath 10/4/22 showing : \" 1. Known severe multivessel coronary artery disease.      2. Patent left internal mammary artery to left anterior descending   artery with collateral circulation to posterior descending artery which   appears to be supplying nonviable myocardium in the inferior distribution     3. Elevated left ventricular end-diastolic pressure,\" w/ estimated EF 25% presents as cardiac arrest-from [place]-unknown down-time-has had cardiac arrest previously in last 10 days-previously seen at [facility]. Unknown arrest rhythm. On arrival hr 60, ekg w/ diffuse st-t changes-depressions/twi v4-v6, interventional consulted and suggested optimal medical management as recent cath 10/4/22 and patient HMD-stable. Was intubated for airway protection. Trops 7.5k initially. Also various bmp abnormalities c/w h/o ckd and major insult including hyperkalemia 5.8, lactate 6.2, bicarb 17, etc. Probnp also 82k    Current evaluation:  -remains intubated/sedated on vent  -on levo & vaso-levo is maxed out-vaso is low-dose  -MAP 80  -on fentanyl for sedation  -trops down to approximately 6.8k;   -tsh high no free t4  -60% fio2, peep 10    Past Medical History:   has a past medical history of Chronic kidney disease, CKD (chronic kidney disease), Diabetes ([facility] [address] Hyperlipidemia, Hypertension, Ischemic heart disease, MRSA (methicillin resistant staph aureus) culture positive, Neuropathy, and Sleep apnea. Past Surgical History:   has a past surgical history that includes Appendectomy; eye surgery; Cardiac catheterization; and Finger trigger release. Home Medications:    Prior to Admission medications    Medication Sig Start Date End Date Taking?  Authorizing Provider   Cholecalciferol (VITAMIN D3) 2000 units CAPS Take by mouth every other day    Historical Provider, MD   sodium polystyrene (SPS) 15 GM/60ML suspension Take 60 mLs by mouth once for 1 dose 6/15/17 6/15/17  Ted Rawls MD   aspirin 81 MG tablet Take 81 mg by mouth daily    Historical Provider, MD   ascorbic acid (VITAMIN C) 500 MG tablet Take 500 mg by mouth daily    Historical Provider, MD   ergocalciferol (DRISDOL) 88120 UNITS capsule Take 1 capsule by mouth once a week For 8 weeks, then 1 capsule by mouth once a month for 4 months 1/11/17   Rhys Poole MD   Aspirin Buf,NlDhl-HsJby-WiRvr, (BUFFERED ASPIRIN) 325 MG TABS Take 325 mg by mouth daily    Historical Provider, MD   atorvastatin (LIPITOR) 40 MG tablet Take 40 mg by mouth daily    Historical Provider, MD   furosemide (LASIX) 40 MG tablet Take 40 mg by mouth daily    Historical Provider, MD   insulin lispro (HUMALOG) 100 UNIT/ML injection vial Inject into the skin Pt has a pump    Historical Provider, MD   hydrALAZINE (APRESOLINE) 50 MG tablet Take 50 mg by mouth Takes one tablet in am and two tablets in evening    Historical Provider, MD   isosorbide mononitrate (IMDUR) 60 MG CR tablet Take 60 mg by mouth daily    Historical Provider, MD   insulin glargine (LANTUS) 100 UNIT/ML injection vial Inject 20 Units into the skin nightly    Historical Provider, MD   lisinopril (PRINIVIL;ZESTRIL) 5 MG tablet Take 2.5 mg by mouth daily     Historical Provider, MD   Multiple Vitamins-Minerals (THERAPEUTIC MULTIVITAMIN-MINERALS) tablet Take 1 tablet by mouth daily    Historical Provider, MD   nitroGLYCERIN (NITROSTAT) 0.4 MG SL tablet Place 0.4 mg under the tongue every 5 minutes as needed for Chest pain    Historical Provider, MD   clopidogrel (PLAVIX) 75 MG tablet Take 75 mg by mouth daily    Historical Provider, MD      Current Facility-Administered Medications: fentaNYL 50 mcg/mL 50 mL infusion,  mcg/hr, IntraVENous, Continuous  norepinephrine (LEVOPHED) 16 mg in sodium chloride 0.9 % 250 mL infusion, 1-100 mcg/min, IntraVENous, Continuous  sodium chloride flush 0.9 % injection 5-40 mL, 5-40 mL, IntraVENous, 2 times per day  sodium chloride flush 0.9 % injection 5-40 mL, 5-40 mL, IntraVENous, PRN  0.9 % sodium chloride infusion, , IntraVENous, PRN  ondansetron (ZOFRAN-ODT) disintegrating tablet 4 mg, 4 mg, Oral, Q8H PRN **OR** ondansetron (ZOFRAN) injection 4 mg, 4 mg, IntraVENous, Q6H PRN  polyethylene glycol (GLYCOLAX) packet 17 g, 17 g, Oral, Daily PRN  acetaminophen (TYLENOL) tablet 650 mg, 650 mg, Oral, Q6H PRN **OR** acetaminophen (TYLENOL) suppository 650 mg, 650 mg, Rectal, Q6H PRN  pantoprazole (PROTONIX) 40 mg in sodium chloride (PF) 10 mL injection, 40 mg, IntraVENous, Daily  calcium gluconate 2000 mg in sodium chloride 100 mL, 2,000 mg, IntraVENous, Once  vancomycin (VANCOCIN) 1000 mg in sodium chloride 0.9% 250 mL IVPB, 15 mg/kg, IntraVENous, Once  sodium zirconium cyclosilicate (LOKELMA) oral suspension 10 g, 10 g, Oral, Once  ampicillin-sulbactam (UNASYN) 1500 mg in 50 mL NS IVPB minibag, 1,500 mg, IntraVENous, Q6H  heparin (porcine) injection 4,000 Units, 4,000 Units, IntraVENous, Once  heparin (porcine) injection 4,000 Units, 4,000 Units, IntraVENous, PRN  heparin (porcine) injection 2,000 Units, 2,000 Units, IntraVENous, PRN  [Held by provider] heparin 25,000 units in dextrose 5 % 250 mL infusion (rate based), 5-30 Units/kg/hr, IntraVENous, Continuous  atropine 1 MG/10ML injection, , ,   EPINEPHrine 1 MG/ML injection, , ,   vasopressin 20 Units in dextrose 5 % 100 mL infusion, 0.04 Units/min, IntraVENous, Continuous  midazolam (VERSED) 1 mg/mL in D5W infusion, 1-10 mg/hr, IntraVENous, Continuous    Allergies:  Patient has no known allergies. Social History:   reports that he has quit smoking. He has never used smokeless tobacco. He reports that he does not drink alcohol. Family History: family history includes Diabetes in his mother; Heart Disease in his father; Other in his mother; Stroke in his mother. No h/o sudden cardiac death. unknown for premature CAD    REVIEW OF SYSTEMS:    Unknown-patient intubated/sedated came in as arrest      PHYSICAL EXAM:      /79   Pulse 67   Temp 97.5 °F (36.4 °C) (Rectal)   Resp (!) 32   Ht 5' 7\" (1.702 m)   Wt 165 lb (74.8 kg)   SpO2 100%   BMI 25.84 kg/m²    Constitutional and General Appearance: intubated/sedated  HEENT: PERRL, no cervical lymphadenopathy. No masses palpable.  Normal oral mucosa  Respiratory:  Intubated-ctab b/l anterior chest-mechanical breath sounds  Cardiovascular: The apical impulse is not displaced  Heart tones are crisp and normal. regular S1 and S2.  Jugular venous pulsation Normal  The carotid upstroke is normal in amplitude and contour without delay or bruit  Peripheral pulses are symmetrical and full   Abdomen:   No masses or tenderness  Bowel sounds present  Extremities:   No Cyanosis or Clubbing   Lower extremity edema: No   Skin: Warm and dry  Neurological:  sedated    DATA:    Diagnostics:    EKG: sinus greg hr 57, st-depression/twi v4-v6  ECHO: 25% per recent cath 10/4/22. Ejection fraction: 25%  Stress Test: not obtained. Cardiac Angiography: 10/4/22 at [facility]:  \" 1. Known severe multivessel coronary artery disease. 2. Patent left internal mammary artery to left anterior descending   artery with collateral circulation to posterior descending artery which   appears to be supplying nonviable myocardium in the inferior distribution     3. Elevated left ventricular end-diastolic pressure. Recommendations:   1. Optimize medical therapy for left ventricular dysfunction   2. Consider referral for AICD in 3 months. Coronary Findings Diagnostic Dominance: Left   Left Main: The vessel was visualized by angiography and is moderate in size. Previously placed Ost LM to Dist LM stent (unknown type) is widely patent. Left Anterior Descending: Ost LAD to Mid LAD lesion is 100% stenosed. Dist LAD lesion is 50% stenosed. Left Circumflex: Previously placed Ost Cx to Dist Cx stent (unknown type) is widely patent. First Obtuse Marginal Branch: 1st Mrg lesion is 100% stenosed. Second Obtuse Marginal Branch: Previously placed 2nd Mrg stent (unknown type) is widely patent. Third Obtuse Marginal Branch: 3rd Mrg filled by collaterals from Dist LAD. 3rd Mrg lesion is 100% stenosed. Left Posterior Descending Artery: LPDA filled by collaterals from Dist LAD.  Left Posterior Atrioventricular Artery: LPAV lesion is 100% stenosed. LIMA Graft To Dist LAD: The graft was visualized by angiography and is large. The graft is angiographically normal.     Intervention   No interventions have been documented. Left Ventricle   The left ventricular size is in the upper limits of normal. There is severe left ventricular systolic dysfunction. LV end diastolic pressure is elevated. The ejection fraction is calculated to be 25%. There are wall motion abnormalities in the left ventricle. There is no evidence of mitral regurgitation. The left ventricular end-diastolic pressure measured 20 mmHg. Aortic Valve   No gradient noted across aortic valve on left heart pullback     Wall Motion   The following segments are akinetic: mid inferior. The following segments are hypokinetic: posterior, mid intravascular septum and basilar posterior. \"    Labs:     CBC:   Recent Labs     10/10/22  0342   WBC 26.6*   HGB 8.6*   HCT 27.5*        BMP:   Recent Labs     10/10/22  0342 10/10/22  0512   *  --    K 5.8*  --    CO2 17*  --    BUN 61*  --    CREATININE 4.60* 4.51*   LABGLOM 14* 13*   GLUCOSE 115*  --      BNP: No results for input(s): BNP in the last 72 hours. PT/INR:   Recent Labs     10/10/22  0342   PROTIME 11.9   INR 1.2     APTT:  Recent Labs     10/10/22  0342   APTT 24.0     CARDIAC ENZYMES:No results for input(s): CKTOTAL, CKMB, CKMBINDEX, TROPONINI in the last 72 hours.   FASTING LIPID PANEL:No results found for: HDL, LDLDIRECT, LDLCALC, TRIG  LIVER PROFILE:  Recent Labs     10/10/22  0342   *   *   LABALBU 3.3*       IMPRESSION:    Patient Active Problem List   Diagnosis    CKD (chronic kidney disease) stage 3, GFR 30-59 ml/min ([facility])    DM (diabetes mellitus) ([facility] [address]    Hx of CABG    Benign essential HTN    Hyperparathyroidism ([facility] [address]    Cardiac arrest ([facility] [address]     -repeat cardiac arrest-2nd in 10 days  -known severe multivessel CAD h/o CABG and multiple stents  -ef 25%  -ckd  -htn  -dm  -hld    RECOMMENDATIONS:  No need repeat cath as recent done [facility] 10/4/22 and patient stable on pressors and troponins downtrending  Continue trend trops  Start heparin-gtt if no contraindications-no bleeding, hgb stable 8, plt ct wnl, inr wnl  Unknown arrest rhythm-if was VF/VT ok for TTM if no obvious coagulopathy/bleeding diathesis  Resume home meds via ogt once more stabilized e.g. [id number], statin  D/w GOC w/ family if available-patient has had repeat arrest in under 10 days severe CAD, poor EF and on high-dose pressors currently    Further recommendations/finalized plan pending d/w attending on rounds.      Shahab Duarte MD  PGY-3, Department of Internal Medicine  [address], [place]

## 2022-10-10 NOTE — PROGRESS NOTES
[address] [facility] [address]     Emergency/Trauma Note    PATIENT NAME: Antonina Melo Sr. Shift date: 10/10/2022  Shift day: Sunday   Shift # 3    Room # 13/13   Name: Antonina Melo Sr.            Age: 60 y.o. Gender: male          Scientologist: [address] 57 [employer]:     Trauma/Incident type: Cardiac Arrest  Admit Date & Time: 10/10/2022  3:21 AM  TRAUMA NAME:     ADVANCE DIRECTIVES IN CHART? No    NAME OF DECISION MAKER: Kera Tobias    RELATIONSHIP OF DECISION MAKER TO PATIENT: Wife    PATIENT/EVENT DESCRIPTION:  Han Oscar is a 60 y.o. male who arrived via EMS as a Trauma Priority. Patient presents in cardiac Arrest. Patient to be admitted to 13/13. SPIRITUAL ASSESSMENT-INTERVENTION-OUTCOME:   provided ministry of presence.  engaged family in conversation. Patient's wife is excellent medical historian. Patient's wife Anurag Degroot shared life review.  provided emotional and spiritual support. PATIENT BELONGINGS:  No belongings noted    ANY BELONGINGS OF SIGNIFICANT VALUE NOTED:  NONE    REGISTRATION STAFF NOTIFIED? No      WHAT IS YOUR SPIRITUAL CARE PLAN FOR THIS PATIENT?:   Chaplains may be paged 24/7 via [facility].     Electronically signed by Bucky Silverio on 10/10/2022 at [address] [facility]  [phone]

## 2022-10-10 NOTE — ED PROVIDER NOTES
[facility]     Emergency Department     Faculty Attestation    I performed a history and physical examination of the patient and discussed management with the resident. I have reviewed and agree with the residents findings including all diagnostic interpretations, and treatment plans as written. Any areas of disagreement are noted on the chart. I was personally present for the key portions of any procedures. I have documented in the chart those procedures where I was not present during the key portions. I have reviewed the emergency nurses triage note. I agree with the chief complaint, past medical history, past surgical history, allergies, medications, social and family history as documented unless otherwise noted below. Documentation of the HPI, Physical Exam and Medical Decision Making performed by scribmary is based on my personal performance of the HPI, PE and MDM. For Physician Assistant/ Nurse Practitioner cases/documentation I have personally evaluated this patient and have completed at least one if not all key elements of the E/M (history, physical exam, and MDM). Additional findings are as noted.     59 yo M post cardiac arrest, sent from [address] [place] [address] Drive, no report, pt had recent cardiac arrest was seen at [facility]  Pe hr 60, intubated, reactive pupils, bilateral breath sounds, abdomen protuberant, non distended, no rigidity,     - I was present for L femoral TL placement & L femoral a line placement, per sterile technique,   --- ekgs x 2 sent to Dr Rayna Johnson, cardiology activated cath lab > cardiology then declined taking pt to cath &sent pt to micu    EKG Interpretation    Interpreted by me  Has bradycardia, heart rate 57, left axis, T wave inversions noted V4 V5 V6 ST depression noted V4 V5 V6, qtc 453    CRITICAL CARE: There was a high probability of clinically significant/life threatening deterioration in this patient's condition which required my urgent intervention. Total critical care time was 35 minutes. This excludes any time for separately reportable procedures.        David Cleveland, DO  10/10/22 [place], DO  10/10/22 6547       Noemi Briones, DO  10/10/22 Dimitry, DO  10/10/22 [place], DO  10/10/22 [place], DO  10/11/22 0140

## 2022-10-10 NOTE — FLOWSHEET NOTE
Wife is trying to locate the patient's insulin pump. We searched linen and trash and patient belongings in current room [phone]. All were searched and pump was not located. Called ER, wife stated that that was the last location for the device, since she stated they removed it.  Er couldn't locate pump, wife notified

## 2022-10-10 NOTE — SIGNIFICANT EVENT
[facility]   Special Communication Note                   Date:   10/10/2022  Patient name: Godwin Johnson. Date of admission:  10/10/2022  3:21 AM  MRN:   [id number]  YOB: 1960  PCP: Liz Jack    Reason for Note: Unjustifiable visit to cath lab      Subject:  I was called earlier this am about this patient wo came in with cardiac arrest for the second time in 10 days. It was reported to me the EKG which I reviewed and suggested no ST elevation and since we have many medical issues to deal with we will deal with possible ischemia, later including heart cath if needed. Further EKG at 540 am suggested the same ST depression in lateral V5-V6 again, and even the patient was stable hemodynamically, we advised that we will take patient to ER. At no point we were told that patient had a heart cath just few days ago after her 1st cardiac arrest at [facility]. The patient was sent back to ER, since clearly reviewing the cath report suggest no benefit from doing additional cath, and the severity of disease should be treated medically, especially also that patient has renal insufficiency. .. Decision:   Cancel cath due to previous cath a week earlier, patient safety related to renal insufficiency and clear TTH cath report suggest no intervention is needed and medical treatment were advised.     Communicated with ER       Electronically signed by Radha Malik MD on 10/10/2022 at 7:02 Cris Hayden 3 Cardiology  [phone]

## 2022-10-10 NOTE — PROCEDURES
PROCEDURE NOTE - CENTRAL VENOUS LINE PLACEMENT    PATIENT NAME: Doni Lepe Sr.  MEDICAL RECORD NO. [id number]  DATE: 10/10/2022  ATTENDING PHYSICIAN: Dr. David Felix DIAGNOSIS:  vascular access and cooling  POSTOPERATIVE DIAGNOSIS:  Same  PROCEDURE PERFORMED:  Right Femoral Vein Central Line Insertion  PERFORMING PHYSICIAN: Terrance Baires MD  ANESTHESIA:  Local utilizing 1% lidocaine  ESTIMATED BLOOD LOSS:  Less than 25 ml  COMPLICATIONS:  None immediately appreciated. DISCUSSION:  Doni Lepe Sr. is a 60y.o.-year-old male who requires central IV access vascular access and cooling. The history and physical examination were reviewed and confirmed. CONSENT: Unable to be obtained due to patient's condition. PROCEDURE:  A timeout was initiated by the bedside nurse and was confirmed by those present. The patient was placed in a supine position. The skin overlying the Right Femoral Vein was prepped with chlorhexadine and draped in sterile fashion. The skin was infiltrated with local anesthetic. The vessel and surrounding anatomy was visualized using ultrasound. Through the anesthetized region, the introducer needle was inserted into the femoral vein returning dark red non pulsatile blood. A guidewire was placed through the center of the needle with no resistance. Ultrasound confirmed presence of wire in the vein and imaging was saved. A small incision made in the skin with a #11 scalpel blade. The dilator was inserted into the skin and vein over guidewire using Seldinger technique. The dilator was then removed and the 9.3F 45cm catheter was placed in the vein over the guidewire using Seldinger technique. The guidewire was then removed and all ports aspirated and flushed appropriately. The catheter then secured using silk suture and a temporary sterile dressing was applied. No immediate complication was evident.   All sponge, instrument and needle counts were correct at the completion of the procedure. Imaging showed that the line was deep and located next to the temp cath, pulled back 5cm. Repeat imaging ordered. Line still too deep, pulled back another 5 cm. The patient tolerated the procedure well with no immediate complication evident.      Marlen Briceño MD  9:11 AM, 10/10/22

## 2022-10-10 NOTE — PROGRESS NOTES
Ventilator Bronchodilator assessment    Breath sounds: clear  Inspiratory Pressure: 28  Plateau Pressure: 26    Patient assessed at level 1          []    Bronchodilator Assessment    BRONCHODILATOR ASSESSMENT SCORE  Score 0 (Home) 1 2 3 4   Breath Sounds   []  Chronic Ventilator: Patient at baseline [x]  Mild Wheezes/ Clear []  Intermittent wheezes with good air entry []  Bilateral/unilateral wheezing with diminished air entry []  Insp/Exp wheeze and/or poor aeration   Ventilator Pressures   []  Chronic Ventilator []  Insp. Pressure less than 25 cm H20 []  Insp. Pressure less than 25 cm H20 [x]  Insp. Pressure exceeds 25 cm H20 []  Insp.  Pressure exceeds 30 cm H20   Plateau Pressure []  NA   [x]  Plateau Pressure less than 4  []  Plateau Pressure less than or equal to 5 []  Plateau Pressure greater than or equal to 6 []  Plateau Pressure greater than or equal to 8       LAILA GUNN RCP  8:54 AM

## 2022-10-10 NOTE — PLAN OF CARE
Problem: Discharge Planning  Goal: Discharge to home or other facility with appropriate resources  Outcome: Progressing  Flowsheets (Taken 10/10/2022 0800)  Discharge to home or other facility with appropriate resources: Identify barriers to discharge with patient and caregiver     Problem: Respiratory - Adult  Goal: Achieves optimal ventilation and oxygenation  10/10/2022 1103 by Ruba Shirley RN  Outcome: Progressing  10/10/2022 0739 by Daniel Landrum RCP  Outcome: Progressing     Problem: Chronic Conditions and Co-morbidities  Goal: Patient's chronic conditions and co-morbidity symptoms are monitored and maintained or improved  Outcome: Progressing  Flowsheets (Taken 10/10/2022 0800)  Care Plan - Patient's Chronic Conditions and Co-Morbidity Symptoms are Monitored and Maintained or Improved: Monitor and assess patient's chronic conditions and comorbid symptoms for stability, deterioration, or improvement     Problem: Pain  Goal: Verbalizes/displays adequate comfort level or baseline comfort level  Outcome: Progressing     Problem: Safety - Adult  Goal: Free from fall injury  Outcome: Progressing     Problem: ABCDS Injury Assessment  Goal: Absence of physical injury  Outcome: Progressing     Problem: Skin/Tissue Integrity  Goal: Absence of new skin breakdown  Description: 1. Monitor for areas of redness and/or skin breakdown  2. Assess vascular access sites hourly  3. Every 4-6 hours minimum:  Change oxygen saturation probe site  4. Every 4-6 hours:  If on nasal continuous positive airway pressure, respiratory therapy assess nares and determine need for appliance change or resting period.   Outcome: Progressing

## 2022-10-10 NOTE — ED NOTES
Pt with history of cardiac arrest last week, discharged from hospital 10/07/222. Pt was supposed to be on a Life Vest but per EMS report via LF personnel, battery was dead. Pt with history of HD, has not received recent treatment. Pt arrives to room 13 per LF4. Pt intubated, on levo gtt @ 60mcg/min. Pt received fentanyl, versed and push dose epi via LF4. Two doses epi, 1 amp bicarb and 1 amp calcium given per EMS. Dr. Franca Hernandez at bedside. Pt placed on Life Pack and cardiac monitor. RT at bedside.       Tabitha Salcedo, RN  10/10/22 4635

## 2022-10-10 NOTE — ED NOTES
Report given to María Elena Foote on CAR3. Awaiting clearance from nephrology before CT chest w/constrast can be performed. Dr. Tim Gupta in waiting room speaking with family.       Maraí Elena Chavez RN  10/10/22 4589

## 2022-10-10 NOTE — ED NOTES
[facility] at bedside to insert central line. Critical care resident at bedside to assess pt.       Cynthia Gamino RN  10/10/22 1768

## 2022-10-10 NOTE — PROGRESS NOTES
The transport originated from ER13. Pt. was transported to [facility]. Assisting with the transport was RN, RT, DR. Ebony Schulz. Appropriate devices were applied to monitor the patient's condition during transport. Patient transported  via 100% O2 via ventilator. Patient tolerated well.         Daisy Shore RCP  7:18 AM

## 2022-10-11 PROBLEM — Z51.5 ENCOUNTER FOR PALLIATIVE CARE: Status: ACTIVE | Noted: 2022-01-01

## 2022-10-11 NOTE — CONSULTS
Palliative Care Inpatient Consult    NAME:  Emily Shay Sr.  MEDICAL RECORD NUMBER:  [id number]  AGE: 60 y.o.    GENDER: male  : 1960  TODAY'S DATE:  10/11/2022    Reasons for Consultation:    Symptom and/or pain management  Provision of information regarding PC and/or hospice philosophies  Complex, time-intensive communication and interdisciplinary psychosocial support  Clarification of goals of care and/or assistance with difficult decision-making  Guidance in regards to resources and transition(s)    Members of PC team contributing to this consultation are :  Dr. Melanie Moss care attending  Plan      Palliative Interaction:  The patient was seen today along with medical student Cho  Patient remains intubated and sedated    I met with neuro critical care team and attending Dr. Lj Toledo and discussed patient's current medical conditions with him  Dr. Lj Toledo explained that patient is currently undergoing hypothermia protocol and further prognostication regarding patient will be done once the protocols are completed    Patient's wife Claude Crane was present in patient's room along with her sister Ryan Gregg  I introduced myself to Claude Crane and explained her the role of palliative care and told her that palliative care is an extra layer of support and strength for the patient and family    Claude Crane told that she and the patient have been  for 36 years and have 2 sons and they live in [facility]  I explained the significance of POA paperwork for health to Claude Crane and told her that if anytime patient is not able to make his decisions then she is his legal spokesperson and Claude Moreland acknowledged her understanding  Claude Crane also said that she has patient's POA paperwork for health at home and she is his POA for health    I explained the different types of codes to Claude Crane and told her that patient's current CODE STATUS is DNR CCA but patient continues to remain intubated and is being treated for everything and Claude Crane told that she would not want the patient to have CPR/chest compressions/shocking to be done if his heart stopped but wants everything else to remain the same    I told Michelle Knowles that palliative care will continue to be the supporting team for the patient, her and family  I offered comfort and emotional support to the patient and family      Education/support to staff  Education/support to family  Education/support to patient  Discharge planning/helping to coordinate care  Communications with primary service  Caregiver support/education  Code status clarified: Full Code  Code status clarified: [facility]  Code status clarified: [facility]  Other major issues      History of Present Illness     The patient is a 60 y.o. Non- / non  male who presents with Cardiac Arrest (Arrives per LF4 from scene. Per LF, pt was cardiac arrest per EMS. Pt with ROSC and transported to [facility] to be transported to [facility]. Pt received 2 rounds of epi, unknown amounts of fentanyl and versed. Pt received 1 amp Bicarb and 1 amp calcium chloride. Pt arrives intubated, no sedation. )      Referred to Palliative Care by   [x] Physician   [] Nursing  [] Family Request   [] Other:       He was admitted to the critical care service for Cardiac arrest ([facility] [address] [I46.9]. His hospital course has been associated with Cardiac arrest [facility]). The patient has a complicated medical history and has been hospitalized since 10/10/2022  3:21 AM.  The history has been obtained from patient's records. Patient presented via [facility] by EMS from [facility] where patient presented status postcardiac arrest.  Patient was intubated and not sedated when he came to the hospital here. Patient had cardiac arrest unknown whether it was V. fib or PEA arrest, was found by EMS and CPR was initiated and ROSC was obtained.   As per records patient had new tunneled dialysis catheter placed on 10/6/2022 for getting dialysis with concerns of patient missing his recent hemodialysis. Patient was transferred to the MICU postcardiac arrest with hypoxic respiratory failure requiring mechanical ventilation. Cardiology, nephrology have been consulted. Palliative care consulted for goals of care, CODE STATUS discussion.   TC CODE STATUS discussion wife    Active Hospital Problems    Diagnosis Date Noted    Cardiac arrest [facility]) [I46.9] 10/10/2022     Priority: Medium    Hyperkalemia [E87.5] 10/10/2022     Priority: Medium    Metabolic acidosis [Z53.23] 10/10/2022     Priority: Medium       PAST MEDICAL HISTORY      Diagnosis Date    Chronic kidney disease     CKD (chronic kidney disease)     Diabetes (HCC)     Hyperlipidemia     Hypertension     Ischemic heart disease     MRSA (methicillin resistant staph aureus) culture positive     Neuropathy     Sleep apnea        PAST SURGICAL HISTORY  Past Surgical History:   Procedure Laterality Date    APPENDECTOMY      CARDIAC CATHETERIZATION      EYE SURGERY      TRIGGER FINGER RELEASE         SOCIAL HISTORY  Social History     Tobacco Use    Smoking status: Former    Smokeless tobacco: Never   Substance Use Topics    Alcohol use: No       ALLERGIES  No Known Allergies      MEDICATIONS  Current Medications    sodium chloride flush  5-40 mL IntraVENous 2 times per day    pantoprazole (PROTONIX) 40 mg injection  40 mg IntraVENous Daily    ampicillin-sulbactam  1,500 mg IntraVENous Q6H    chlorhexidine  15 mL Mouth/Throat BID     sodium chloride flush, sodium chloride, polyethylene glycol, acetaminophen **OR** acetaminophen, heparin (porcine), heparin (porcine), potassium chloride, magnesium sulfate, calcium gluconate **OR** calcium gluconate **OR** calcium gluconate **OR** calcium gluconate, sodium phosphate IVPB **OR** sodium phosphate IVPB **OR** sodium phosphate IVPB **OR** sodium phosphate IVPB, ondansetron **OR** ondansetron, heparin (porcine), heparin (porcine)  IV Drips/Infusions   fentaNYL 50 mcg/mL 50 mcg/hr (10/11/22 0946) norepinephrine 13 mcg/min (10/11/22 0924)    sodium chloride 10 mL/hr at 10/11/22 0924    vasopressin (Septic Shock) infusion 0.04 Units/min (10/11/22 0924)    midazolam Stopped (10/11/22 0156)    CRRT dialysis builder 1,500 mL/hr (10/11/22 0456)    sodium bicarbonate infusion 100 mL/hr at 10/11/22 0956    heparin (PORCINE) Infusion 10 Units/kg/hr (10/11/22 0924)     Home Medications  No current facility-administered medications on file prior to encounter.      Current Outpatient Medications on File Prior to Encounter   Medication Sig Dispense Refill    Cholecalciferol (VITAMIN D3) 2000 units CAPS Take by mouth every other day      sodium polystyrene (SPS) 15 GM/60ML suspension Take 60 mLs by mouth once for 1 dose 1 Bottle 0    aspirin 81 MG tablet Take 81 mg by mouth daily      ascorbic acid (VITAMIN C) 500 MG tablet Take 500 mg by mouth daily      ergocalciferol (DRISDOL) 64740 UNITS capsule Take 1 capsule by mouth once a week For 8 weeks, then 1 capsule by mouth once a month for 4 months 12 capsule 0    Aspirin Buf,[id number], (BUFFERED ASPIRIN) 325 MG TABS Take 325 mg by mouth daily      atorvastatin (LIPITOR) 40 MG tablet Take 40 mg by mouth daily      furosemide (LASIX) 40 MG tablet Take 40 mg by mouth daily      insulin lispro (HUMALOG) 100 UNIT/ML injection vial Inject into the skin Pt has a pump      hydrALAZINE (APRESOLINE) 50 MG tablet Take 50 mg by mouth Takes one tablet in am and two tablets in evening      isosorbide mononitrate (IMDUR) 60 MG CR tablet Take 60 mg by mouth daily      insulin glargine (LANTUS) 100 UNIT/ML injection vial Inject 20 Units into the skin nightly      lisinopril (PRINIVIL;ZESTRIL) 5 MG tablet Take 2.5 mg by mouth daily       Multiple Vitamins-Minerals (THERAPEUTIC MULTIVITAMIN-MINERALS) tablet Take 1 tablet by mouth daily      nitroGLYCERIN (NITROSTAT) 0.4 MG SL tablet Place 0.4 mg under the tongue every 5 minutes as needed for Chest pain      clopidogrel (PLAVIX) 75 MG tablet Take 75 mg by mouth daily         Data         BP (!) 119/59   Pulse 62   Temp (!) 91.6 °F (33.1 °C) (Esophageal)   Resp 18   Ht 5' 2\" (1.575 m)   Wt 163 lb 9.3 oz (74.2 kg)   SpO2 99%   BMI 29.92 kg/m²     Wt Readings from Last 3 Encounters:   10/11/22 163 lb 9.3 oz (74.2 kg)   02/21/20 172 lb (78 kg)   10/11/19 177 lb (80.3 kg)        Code Status: DNR-CCA     ADVANCED CARE PLANNING:  Patient has capacity for medical decisions: no  Health Care Power of : yes as per patient's wife but not present in his chart  Living Will: no     Personal, Social, and Family History  Marital Status:   Living situation: with family:  spouse  Does patient understand diagnosis/treatment? no  Does caregiver understand diagnosis/treatment?  yes    Past Medical History:   Diagnosis Date    Chronic kidney disease     CKD (chronic kidney disease)     Diabetes (HCC)     Hyperlipidemia     Hypertension     Ischemic heart disease     MRSA (methicillin resistant staph aureus) culture positive     Neuropathy     Sleep apnea          Family History   Problem Relation Age of Onset    Stroke Mother     Diabetes Mother     Other Mother         pace maker    Heart Disease Father        Social History     Tobacco Use    Smoking status: Former    Smokeless tobacco: Never   Substance Use Topics    Alcohol use: No           Assessment        REVIEW OF SYSTEMS  ROS unable to be done, patient intubated and sedated  Constitutional: no fever, no chills or weight loss  Eyes: no eye pain or blurred vision  ENT: no hearing loss, congestion, or difficulty swallowing   Respiratory: no wheezing, chest tightness, or shortness of breath   Cardiovascular: no chest pain or pressure, no palpitations, no diaphoresis   Gastrointestinal: no nausea, vomiting,abdominal pain, diarrhea or constipation, no melena   Genitourinary: no dysuria, frequency, hematuria, or nocturia   Musculoskeletal: no myalgias or arthralgias, no back pain   Skin: no rashes or sores   Neurological: no focal weakness, numbness, tingling or headache, no seizures    PHYSICAL ASSESSMENT:  Constitutional: Intubated and sedated  Head: Normocephalic and atraumatic   Eyes: EOM are normal  Neck: ET tube present  Cardiovascular: Normal rate and regular rhythm  Pulmonary/Chest: On mechanical ventilator  Abdomen: not distended, no ascites  Musculoskeletal: No edema lower ext. Neurological: Intubated and sedated  Skin: No rash, erythema    Palliative Performance Scale:  ___60%  Ambulation reduced; Significant disease; Can't do hobbies/housework; intake normal or reduced; occasional assist; LOC full/confusion  ___50%  Mainly sit/lie; Extensive disease; Can't do any work; Considerable assist; intake normal or reduced; LOC full/confusion  ___40%  Mainly in bed; Extensive disease; Mainly assist; intake normal or reduced; LOC full/confusion   ___30%  Bed Bound; Extensive disease; Total care; intake reduced; LOC full/confusion  __x_20%  Bed Bound; Extensive disease; Total care; intake minimal; Drowsy/coma  ___10%  Bed Bound; Extensive disease; Total care; Mouth care only; Drowsy/coma  ___0       Death      Principle Problem/Diagnosis:  Cardiac arrest [facility])    Additional Assessments:   Principal Problem:    Cardiac arrest ([facility] [address]  Active Problems:    Hyperkalemia    Metabolic acidosis    1- Symptom management/ pain control     Pain Assessment:  Pain is controlled with current analgesics. Medication(s) being used: acetaminophen, narcotic analgesics including fentanyl                Anxiety:  none                          Dyspnea:  none                          Fatigue:  none    Other: Intubated and sedated    We feel the patient symptoms are being controlled. his current regimen is reviewed by myself and discussed with the staff.      We will continue to follow-up with the family for further goals of care  We will continue to provide comfort and support to the patient and family    2- Goals of care evaluation   The patient goals of care are improve or maintain function/quality of life, accomplish a particular personal goal, spiritual needs, strengthening relationships, preserve independence/autonomy/control, and support for family/caregiver   Goals of care discussed with:    [] Patient independently    [] Patient and Family    [x] Family or Healthcare DPOA independently    [] Unable to discuss with patient, family/DPOA not present    3- Code Status  DNR-CCA    4- Other recommendations   - We will continue to provide comfort and support to the patient and the family  Please call with any palliative questions or concerns. Palliative Care Team is available via perfect serve or via phone. Palliative Care will continue to follow Mr. Hoang's care as needed. Thank you for allowing Palliative Care to participate in the care of Mr. Lyndall Snellen . This note has been dictated by dragon, typing errors may be a possibility.     The total time I spent in seeing the patient, discussing goals of care, advanced directives, code status, greater than 50% time in counseling and other major issues was more than 60 minutes      Electronically signed by   Sharda Son [facility]. Team  on 10/11/2022 at 10:44 AM    Palliative care office: [phone]

## 2022-10-11 NOTE — CONSULTS
FINGER RELEASE         Social History:   Social History     Socioeconomic History    Marital status:      Spouse name: Not on file    Number of children: Not on file    Years of education: Not on file    Highest education level: Not on file   Occupational History    Not on file   Tobacco Use    Smoking status: Former    Smokeless tobacco: Never   Substance and Sexual Activity    Alcohol use: No    Drug use: Not on file    Sexual activity: Not on file   Other Topics Concern    Not on file   Social History Narrative    Not on file     Social Determinants of Health     Financial Resource Strain: Not on file   Food Insecurity: Not on file   Transportation Needs: Not on file   Physical Activity: Not on file   Stress: Not on file   Social Connections: Not on file   Intimate Partner Violence: Not on file   Housing Stability: Not on file       Family History:       Problem Relation Age of Onset    Stroke Mother     Diabetes Mother     Other Mother         pace maker    Heart Disease Father        Allergies:  Patient has no known allergies. Home Medications:  Prior to Admission medications    Medication Sig Start Date End Date Taking?  Authorizing Provider   Cholecalciferol (VITAMIN D3) 2000 units CAPS Take by mouth every other day    Historical Provider, MD   sodium polystyrene (SPS) 15 GM/60ML suspension Take 60 mLs by mouth once for 1 dose 6/15/17 6/15/17  Cezar Hinkle MD   aspirin 81 MG tablet Take 81 mg by mouth daily    Historical Provider, MD   ascorbic acid (VITAMIN C) 500 MG tablet Take 500 mg by mouth daily    Historical Provider, MD   ergocalciferol (DRISDOL) 56030 UNITS capsule Take 1 capsule by mouth once a week For 8 weeks, then 1 capsule by mouth once a month for 4 months 1/11/17   Cezar Hinkle MD   Aspirin Buf,MiMva-HtLxt-ToHyr, (BUFFERED ASPIRIN) 325 MG TABS Take 325 mg by mouth daily    Historical Provider, MD   atorvastatin (LIPITOR) 40 MG tablet Take 40 mg by mouth daily    Historical Provider, MD   furosemide (LASIX) 40 MG tablet Take 40 mg by mouth daily    Historical Provider, MD   insulin lispro (HUMALOG) 100 UNIT/ML injection vial Inject into the skin Pt has a pump    Historical Provider, MD   hydrALAZINE (APRESOLINE) 50 MG tablet Take 50 mg by mouth Takes one tablet in am and two tablets in evening    Historical Provider, MD   isosorbide mononitrate (IMDUR) 60 MG CR tablet Take 60 mg by mouth daily    Historical Provider, MD   insulin glargine (LANTUS) 100 UNIT/ML injection vial Inject 20 Units into the skin nightly    Historical Provider, MD   lisinopril (PRINIVIL;ZESTRIL) 5 MG tablet Take 2.5 mg by mouth daily     Historical Provider, MD   Multiple Vitamins-Minerals (THERAPEUTIC MULTIVITAMIN-MINERALS) tablet Take 1 tablet by mouth daily    Historical Provider, MD   nitroGLYCERIN (NITROSTAT) 0.4 MG SL tablet Place 0.4 mg under the tongue every 5 minutes as needed for Chest pain    Historical Provider, MD   clopidogrel (PLAVIX) 75 MG tablet Take 75 mg by mouth daily    Historical Provider, MD       Current Medications:   Current Facility-Administered Medications: fentaNYL 50 mcg/mL 50 mL infusion,  mcg/hr, IntraVENous, Continuous  norepinephrine (LEVOPHED) 16 mg in sodium chloride 0.9 % 250 mL infusion, 1-100 mcg/min, IntraVENous, Continuous  sodium chloride flush 0.9 % injection 5-40 mL, 5-40 mL, IntraVENous, 2 times per day  sodium chloride flush 0.9 % injection 5-40 mL, 5-40 mL, IntraVENous, PRN  0.9 % sodium chloride infusion, , IntraVENous, PRN  polyethylene glycol (GLYCOLAX) packet 17 g, 17 g, Oral, Daily PRN  acetaminophen (TYLENOL) tablet 650 mg, 650 mg, Oral, Q6H PRN **OR** acetaminophen (TYLENOL) suppository 650 mg, 650 mg, Rectal, Q6H PRN  pantoprazole (PROTONIX) 40 mg in sodium chloride (PF) 10 mL injection, 40 mg, IntraVENous, Daily  ampicillin-sulbactam (UNASYN) 1500 mg in 50 mL NS IVPB minibag, 1,500 mg, IntraVENous, Q6H  heparin (porcine) injection 4,000 Units, 4,000 Units, IntraVENous, PRN  heparin (porcine) injection 2,000 Units, 2,000 Units, IntraVENous, PRN  vasopressin 20 Units in dextrose 5 % 100 mL infusion, 0.04 Units/min, IntraVENous, Continuous  midazolam (VERSED) 1 mg/mL in D5W infusion, 1-10 mg/hr, IntraVENous, Continuous  potassium chloride 20 mEq/50 mL IVPB (Central Line), 20 mEq, IntraVENous, PRN  magnesium sulfate 1000 mg in dextrose 5% 100 mL IVPB, 1,000 mg, IntraVENous, PRN  calcium gluconate 1000 mg in sodium chloride 50 mL, 1,000 mg, IntraVENous, PRN **OR** calcium gluconate 2000 mg in sodium chloride 100 mL, 2,000 mg, IntraVENous, PRN **OR** calcium gluconate 3,000 mg in dextrose 5 % 100 mL IVPB, 3,000 mg, IntraVENous, PRN **OR** calcium gluconate 4,000 mg in dextrose 5 % 100 mL IVPB, 4,000 mg, IntraVENous, PRN  sodium phosphate 6 mmol in sodium chloride 0.9 % 250 mL IVPB, 6 mmol, IntraVENous, PRN **OR** sodium phosphate 12 mmol in sodium chloride 0.9 % 250 mL IVPB, 12 mmol, IntraVENous, PRN **OR** sodium phosphate 18 mmol in sodium chloride 0.9 % 500 mL IVPB, 18 mmol, IntraVENous, PRN **OR** sodium phosphate 24 mmol in sodium chloride 0.9 % 500 mL IVPB, 24 mmol, IntraVENous, PRN  prismaSATE BK 0/3.5 5,000 mL with potassium chloride 10 mEq solution, 1,500 mL/hr, Dialysis, Continuous  sodium bicarbonate 75 mEq in sodium chloride 0.45 % 1,000 mL infusion, , IntraVENous, Continuous  heparin 25,000 units in dextrose 5 % 250 mL infusion (rate based), 5-30 Units/kg/hr, IntraVENous, Continuous  ondansetron (ZOFRAN-ODT) disintegrating tablet 4 mg, 4 mg, Oral, Q8H PRN **OR** ondansetron (ZOFRAN) injection 4 mg, 4 mg, IntraVENous, Q6H PRN  chlorhexidine (PERIDEX) 0.12 % solution 15 mL, 15 mL, Mouth/Throat, BID  heparin (porcine) injection 1,700 Units, 1,700 Units, IntraCATHeter, PRN  heparin (porcine) injection 1,800 Units, 1,800 Units, IntraCATHeter, PRN    REVIEW OF SYSTEMS:       Unable to obtain 2/2 patient condition    PHYSICAL EXAM:     BP (!) 116/43   Pulse 53 Temp (!) 91.4 °F (33 °C) (Esophageal)   Resp 18   Ht 5' 2\" (1.575 m)   Wt 163 lb 9.3 oz (74.2 kg)   SpO2 100%   BMI 29.92 kg/m²     PHYSICAL EXAM:  CONSTITUTIONAL:  Intubated, On Fentanyl infusion. Opens eyes. Does not track or follow commands   HEAD:  normocephalic, atraumatic    EYES:  PERRL   ENT:  ETT in place   NECK:  supple, symmetric   LUNGS:  Equal air entry bilaterally   CARDIOVASCULAR:  normal s1 / s2, RRR. On Levo @10mcg/min, vaso @ 0.4 mg/hr, hep gtt   ABDOMEN:  Soft, no rigidity   NEUROLOGIC:  Mental Status:  Intubated and sedated. Does not follow commands. Cranial Nerves:    II: Visual fields:  abnormal - No blink to threat  III: Pupils:  equal, round, reactive to light   Weak corneals bilaterally  Cough intact    Motor Exam:    Weakly localizes bilateral upper extremities  Withdraws bilateral lower extremities.       LABS AND IMAGING:     RECENT LABS:  CBC with Differential:    Lab Results   Component Value Date/Time    WBC 29.4 10/11/2022 03:41 AM    RBC 2.74 10/11/2022 03:41 AM    HGB 8.0 10/11/2022 03:41 AM    HCT 23.5 10/11/2022 03:41 AM     10/11/2022 03:41 AM    MCV 85.8 10/11/2022 03:41 AM    MCH 29.2 10/11/2022 03:41 AM    MCHC 34.0 10/11/2022 03:41 AM    RDW 15.8 10/11/2022 03:41 AM    LYMPHOPCT 10 10/11/2022 03:41 AM    MONOPCT 2 10/11/2022 03:41 AM    BASOPCT 0 10/11/2022 03:41 AM    MONOSABS 0.59 10/11/2022 03:41 AM    LYMPHSABS 2.94 10/11/2022 03:41 AM    EOSABS 0.00 10/11/2022 03:41 AM    BASOSABS 0.00 10/11/2022 03:41 AM     BMP:    Lab Results   Component Value Date/Time     10/11/2022 03:41 AM    K 4.7 10/11/2022 03:41 AM    CL 96 10/11/2022 03:41 AM    CO2 20 10/11/2022 03:41 AM    BUN 46 10/11/2022 03:41 AM    LABALBU 2.9 10/11/2022 03:41 AM    CREATININE 2.74 10/11/2022 03:41 AM    CALCIUM 8.3 10/11/2022 03:41 AM    LABGLOM 25 10/11/2022 03:41 AM    GLUCOSE 130 10/11/2022 03:41 AM       RADIOLOGY:  XR ABDOMEN (KUB) (SINGLE AP VIEW)   Final Result Endotracheal tube is in appropriate position. NG tube is in appropriate   position. Right central line distal tip is within the inferior aspect of right atrium. Bilateral femoral lines are in place with the right femoral line terminating   at the cavoatrial junction and left femoral line terminating in the mid   pelvis. Unchanged extensive consolidative changes of left lung and small left pleural   effusion and unchanged increased interstitial markings of both lungs. .      Unchanged 9 mm linear density projecting over left lower lung field. It is   unclear whether the finding is aspirated into the left lung or is in the   overlying soft tissues of the chest wall. If there is need for further   evaluation chest CT can be obtained. XR CHEST (SINGLE VIEW FRONTAL)   Final Result      Endotracheal tube is in appropriate position. NG tube is in appropriate   position. Right central line distal tip is within the inferior aspect of right atrium. Bilateral femoral lines are in place with the right femoral line terminating   at the cavoatrial junction and left femoral line terminating in the mid   pelvis. Unchanged extensive consolidative changes of left lung and small left pleural   effusion and unchanged increased interstitial markings of both lungs. .      Unchanged 9 mm linear density projecting over left lower lung field. It is   unclear whether the finding is aspirated into the left lung or is in the   overlying soft tissues of the chest wall. If there is need for further   evaluation chest CT can be obtained. XR ABDOMEN (KUB) (SINGLE AP VIEW)   Final Result   1. Endotracheal tube remains in appropriate position. 2.  Enteric tube terminates at the level the body of the stomach. 3.  Femoral lines in place, as described.       4.  Bilateral airspace disease and small left effusion again noted, although   less prominent in the interval.         XR CHEST PORTABLE Final Result   1. Endotracheal tube remains in appropriate position. 2.  Enteric tube terminates at the level the body of the stomach. 3.  Femoral lines in place, as described. 4.  Bilateral airspace disease and small left effusion again noted, although   less prominent in the interval.         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 Old right basal ganglionic lacune. No acute intracranial abnormality. XR CHEST PORTABLE   Final Result   1. Endotracheal tube measures approximately 6-7 cm above the expected   location the joie, somewhat difficult to see given overlapping lines leads   and median sternotomy wires. 2. Diffuse parenchymal infiltrates are seen in the perihilar regions and   throughout the lungs bilaterally, which may represent diffuse pulmonary edema   and/or multifocal pneumonia. 3. Postoperative changes from prior open heart surgery, with coronary stents   noted. 4. Probable 5th anterolateral right rib fracture with minimal displacement. No pneumothorax. Labs and Images reviewed with:    [x] Obie Stubbs MD    [] Stacie Witt MD  [] Lyle Sweeney MD  --[] there are no new interval images to review. ASSESSMENT AND PLAN:         ASSESSMENT:     This is a 60 y.o. male s/p cardiorespiratory arrest. Found down unresponsive by family. EMS completed 2 rounds of epi with ROSC, unknown initial rhythm. Multiple doses of epinephrine given by EMS for hypotension. [place] consulted for prognostication. Nephrology consulted for CVVH. Prior cardiac arrest approximately 2 weeks ago, was hospitalized at [facility]. Cardiac cath showed EF 25%, occluded LAKIA, patent stents. Discharged on Life vest.     Patient care will be discussed with attending, will reevaluate patient along with attending.      PLAN/MEDICAL DECISION MAKING:    Neurological    - CTH yesterday w/ old infarct involving posterior R frontal lobe, old R basal ganglionic lacune  - LTME, monitor off AEDs  - MRI w/o @ 72 hours, tentatively for Thursday 10/13  - NSE @ 24, 48, 72 hours    Remainder of care per primary. We will continue to follow along. For any changes in exam or patient status please contact Neuro Critical Care.       Riaz Harrison, DO  Neuro Critical Care  Pager [phone]  10/11/2022     8:25 AM

## 2022-10-11 NOTE — PLAN OF CARE
Problem: Respiratory - Adult  Goal: Achieves optimal ventilation and oxygenation  10/11/2022 0830 by Adela Valladares RCP  Outcome: Progressing  10/10/2022 2054 by Orestes Plasencia RN  Outcome: Progressing

## 2022-10-11 NOTE — PROGRESS NOTES
INTENSIVE CARE UNIT  Resident Physician Progress Note    Patient - Nigel Ozuna Sr. Date of Admission -  10/10/2022  3:21 AM  Date of Evaluation -  10/11/2022  Room and Bed Number -  54 [facility] - 1  Chief Complaint   Patient presents with    Cardiac Arrest     Arrives per LF4 from scene. Per LF, pt was cardiac arrest per EMS. Pt with ROSC and transported to [facility] to be transported to [facility]. Pt received 2 rounds of epi, unknown amounts of fentanyl and versed. Pt received 1 amp Bicarb and 1 amp calcium chloride. Pt arrives intubated, no sedation. SUBJECTIVE:     HISTORY OF PRESENT ILLNESS:    Is a 44-year-old male with a history of CAD status post CABG in the 90s, recent cardiac catheterization a week ago, CK D, type 1 diabetes with an insulin pump in place, and a recent cardiac arrest on 10/1, has a LifeVest on. To [facility].  Unclear initial rhythm was found by EMS who did 2 rounds of epi, 1 dose of bicarb and ROSC was achieved. Patient was then life flighted to our emergency department. While being LifeFlight patient was given 410 mcg doses of push dose epi and started on Levophed. Has a new tunneled dialysis catheter placed on 10/6. Was intubated emergency department for airway protection. Patient was started on fentanyl. He had a intact pupillary response, cough and gag. Work-up showed a white blood cell count of 26.6, hemoglobin of 8.6, lactic acid of 6.2. GI was reviewed by cardiology with no concerns for STEMI. Critical care was consulted for admission due to patient being postarrest with hypoxic respiratory failure requiring mechanical ventilation. OVERNIGHT EVENTS:      Patient was cooled overnight. Found to have myoclonic jerks overnight as well. TODAY:    Neuro crit care consulted, will start warming patient around 4 PM.  Asked family to obtain nap- [employer] Att[employer] Parents information so we can call the company for further information.   OBJECTIVE:     VITAL SIGNS:  Patient Vitals for the past 8 hrs:   BP Temp Temp src Pulse Resp SpO2 Height   10/11/22 1700 -- -- -- 63 18 100 % --   10/11/22 1645 -- -- -- 64 18 99 % --   10/11/22 1630 -- -- -- 68 17 95 % --   10/11/22 1615 -- -- -- 71 18 97 % --   10/11/22 1600 (!) 107/40 (!) 91.6 °F (33.1 °C) Esophageal 74 14 100 % --   10/11/22 1548 -- -- -- 64 11 100 % --   10/11/22 1545 -- -- -- 64 (!) 8 100 % --   10/11/22 1530 -- -- -- 59 10 100 % --   10/11/22 1515 -- -- -- 60 17 100 % --   10/11/22 1500 -- -- -- 60 18 100 % --   10/11/22 1445 -- -- -- 63 18 100 % --   10/11/22 1430 -- -- -- 64 18 100 % --   10/11/22 1421 -- -- -- -- -- -- 5' 2\" (1.575 m)   10/11/22 1415 -- -- -- 66 16 100 % --   10/11/22 1400 -- -- -- 73 11 100 % --   10/11/22 1345 -- -- -- 70 17 96 % --   10/11/22 1330 -- -- -- 70 12 96 % --   10/11/22 1315 -- -- -- 66 18 97 % --   10/11/22 1300 -- -- -- 68 16 98 % --   10/11/22 1245 -- -- -- 68 17 97 % --   10/11/22 1230 -- -- -- 66 17 98 % --   10/11/22 1215 -- -- -- 66 18 100 % --   10/11/22 1200 (!) 116/50 (!) 91.6 °F (33.1 °C) Esophageal 67 13 99 % --   10/11/22 1145 -- -- -- 62 (!) 8 98 % --   10/11/22 1130 -- -- -- 58 (!) 0 99 % --   10/11/22 1115 -- -- -- 64 (!) 6 97 % --   10/11/22 1108 -- -- -- 60 (!) 7 100 % --   10/11/22 1100 -- -- -- 59 (!) 0 100 % --   10/11/22 1045 -- -- -- 58 (!) 0 100 % --   10/11/22 1030 -- -- -- 59 (!) 0 100 % --   10/11/22 1015 -- -- -- 63 18 100 % --       Last Body weight:   Wt Readings from Last 3 Encounters:   10/11/22 163 lb 9.3 oz (74.2 kg)   20 172 lb (78 kg)   10/11/19 177 lb (80.3 kg)       Body Mass Index : Body mass index is 29.92 kg/m². Tmax over 24 hours: Temp (24hrs), Av.5 °F (33.1 °C), Min:91.4 °F (33 °C), Max:91.6 °F (33.1 °C)      Ins/Outs:    In: 4804.3 [I.V.:4416.2]  Out: 4460 [Urine:20]    PHYSICAL EXAM:  Constitutional: Intubated sedated, not making purposeful movements  EENT: Pupils sluggishly reactive, ET tube in place  Neck: Supple, symmetrical, trachea midline  Respiratory: Mechanical breath sounds, diminished  Cardiovascular: regular rate and rhythm  Abdomen: soft, nontender, nondistended  Extremities:  peripheral pulses normal  MEDICATIONS:  Scheduled Meds:   sodium chloride flush  5-40 mL IntraVENous 2 times per day    pantoprazole (PROTONIX) 40 mg injection  40 mg IntraVENous Daily    ampicillin-sulbactam  1,500 mg IntraVENous Q6H    chlorhexidine  15 mL Mouth/Throat BID       Continuous Infusions:   CRRT dialysis builder 1,500 mL/hr (10/11/22 1609)    fentaNYL 50 mcg/mL 100 mcg/hr (10/11/22 1710)    norepinephrine 20 mcg/min (10/11/22 1710)    sodium chloride 10 mL/hr at 10/11/22 1710    vasopressin (Septic Shock) infusion 0.04 Units/min (10/11/22 1710)    sodium bicarbonate infusion 100 mL/hr at 10/11/22 1710    heparin (PORCINE) Infusion 10 Units/kg/hr (10/11/22 0924)       PRN Meds:   sodium chloride flush, 5-40 mL, PRN  sodium chloride, , PRN  polyethylene glycol, 17 g, Daily PRN  acetaminophen, 650 mg, Q6H PRN   Or  acetaminophen, 650 mg, Q6H PRN  heparin (porcine), 4,000 Units, PRN  heparin (porcine), 2,000 Units, PRN  potassium chloride, 20 mEq, PRN  magnesium sulfate, 1,000 mg, PRN  calcium gluconate, 1,000 mg, PRN   Or  calcium gluconate, 2,000 mg, PRN   Or  calcium gluconate, 3,000 mg, PRN   Or  calcium gluconate, 4,000 mg, PRN  sodium phosphate IVPB, 6 mmol, PRN   Or  sodium phosphate IVPB, 12 mmol, PRN   Or  sodium phosphate IVPB, 18 mmol, PRN   Or  sodium phosphate IVPB, 24 mmol, PRN  ondansetron, 4 mg, Q8H PRN   Or  ondansetron, 4 mg, Q6H PRN  heparin (porcine), 1,700 Units, PRN  heparin (porcine), 1,800 Units, PRN        SUPPORT DEVICES: [x] Ventilator [] BIPAP  [] Nasal Cannula [] Room Air    VENT SETTINGS (Comprehensive) (if applicable):   PRVC mode, FiO2 40% %, PEEP 10, Respiratory Rate 18, Tidal Volume 530  Vent Information  Ventilator ID: [id number]  Equipment Changed: HME  Additional Respiratory Assessments  Heart Rate: 63  Resp: 18  SpO2: 100 %  End Tidal CO2: 28 (%)  Position: Semi-Jaquez's  Humidification Source: HME  Skin Barrier Applied: No  Lab Results   Component Value Date/Time    MODE [facility] 10/10/2022 05:12 AM       ABGs:   Arterial Blood Gas result:  pH 7.35; pCO2 38.6; pO2 153.1; HCO3 21;  No results found for: PH, PCO2, PO2, HCO3, O2SAT    DATA:  Complete Blood Count:   Recent Labs     10/10/22  0342 10/10/22  0759 10/11/22  0341   WBC 26.6* 33.1* 29.4*   RBC 3.07* 2.91* 2.74*   HGB 8.6* 8.2* 8.0*   HCT 27.5* 25.3* 23.5*   MCV 89.6 86.9 85.8   MCH 28.0 28.2 29.2   MCHC 31.3 32.4 34.0   RDW 15.8* 15.6* 15.8*    347 334   MPV 10.9 10.7 10.6        Last 3 Blood Glucose:   Recent Labs     10/10/22  0342 10/10/22  0759 10/10/22  1644 10/10/22  2131 10/11/22  0341 10/11/22  0929 10/11/22  1530   GLUCOSE 115* 164* 135* 123* 130* 142* 155*        PT/INR:    Lab Results   Component Value Date/Time    PROTIME 13.6 10/10/2022 04:44 PM    INR 1.4 10/10/2022 04:44 PM     PTT:    Lab Results   Component Value Date/Time    APTT 115.5 10/11/2022 12:41 PM       Basic Metabolic Profile:   Recent Labs     10/11/22  0341 10/11/22  0929 10/11/22  1530   * 137 135   K 4.7 4.8 4.6   CL 96* 97* 96*   CO2 20 20 17*   BUN 46* 41* 39*   CREATININE 2.74* 2.52* 2.25*   GLUCOSE 130* 142* 155*   PHOS 4.8* 5.3* 5.2*       Liver Function:  Recent Labs     10/11/22  0341   PROT 5.1*   LABALBU 2.9*   *   AST 94*   ALKPHOS 174*   BILITOT 0.5       Magnesium:   Lab Results   Component Value Date/Time    MG 1.9 10/11/2022 03:30 PM    MG 1.9 10/11/2022 09:29 AM    MG 1.9 10/11/2022 03:41 AM     Phosphorus:   Lab Results   Component Value Date/Time    PHOS 5.2 10/11/2022 03:30 PM    PHOS 5.3 10/11/2022 09:29 AM    PHOS 4.8 10/11/2022 03:41 AM     Ionized Calcium:   Lab Results   Component Value Date/Time    CAION 1.15 10/11/2022 03:30 PM    CAION 1.18 10/11/2022 09:29 AM    CAION 1.15 10/11/2022 03:41 AM        Urinalysis:   Lab Results   Component Value Date/Time    NITRU NEGATIVE 10/10/2022 03:52 AM    COLORU Dark Yellow 10/10/2022 03:52 AM    PHUR 5.0 10/10/2022 03:52 AM    WBCUA 0 TO 2 10/10/2022 03:52 AM    RBCUA 2 TO 5 10/10/2022 03:52 AM    SPECGRAV 1.022 10/10/2022 03:52 AM    LEUKOCYTESUR TRACE 10/10/2022 03:52 AM    UROBILINOGEN Normal 10/10/2022 03:52 AM    BILIRUBINUR NEGATIVE  Verified by ictotest. 10/10/2022 03:52 AM    GLUCOSEU NEGATIVE 10/10/2022 03:52 AM    KETUA TRACE 10/10/2022 03:52 AM       HgBA1c:  No results found for: LABA1C  TSH:    Lab Results   Component Value Date/Time    TSH 6.38 10/10/2022 03:42 AM     Lactic Acid: No results found for: LACTA   Troponin: No results for input(s): TROPONINI in the last 72 hours. Other Labs:  Results for orders placed or performed during the hospital encounter of 10/10/22   Blood Culture 1    Specimen: Blood   Result Value Ref Range    Specimen Description . BLOOD     Special Requests LT AC 10ML     Culture NO GROWTH 1 DAY    Culture, Urine    Specimen: Urine, straight catheter   Result Value Ref Range    Specimen Description . URINE,STRAIGHT CATHETER     Culture NO GROWTH    Culture, Urine    Specimen: Urine, clean catch   Result Value Ref Range    Specimen Description . CLEAN CATCH URINE     Culture DUPLICATE ORDER    MRSA DNA Probe, Nasal    Specimen: Nasal   Result Value Ref Range    Specimen Description . NASAL SWAB     MRSA, DNA, Nasal NEGATIVE NEGATIVE   Culture, Blood 1    Specimen: Blood   Result Value Ref Range    Specimen Description . BLOOD     Special Requests 1.5 RIGHT HAND     Culture NO GROWTH 1 DAY    CMP   Result Value Ref Range    Glucose 115 (H) 70 - 99 mg/dL    BUN 61 (H) 8 - 23 mg/dL    Creatinine 4.60 (H) 0.70 - 1.20 mg/dL    Est, Glom Filt Rate 14 (L) >60 mL/min/1.73m2    Calcium 8.5 (L) 8.6 - 10.4 mg/dL    Sodium 127 (L) 135 - 144 mmol/L    Potassium 5.8 (H) 3.7 - 5.3 mmol/L    Chloride 91 (L) 98 - 107 mmol/L    CO2 17 (L) 20 - 31 mmol/L    Anion Gap 19 (H) 9 - 17 mmol/L    Alkaline Phosphatase 204 (H) 40 - 129 U/L     (H) 5 - 41 U/L     (H) <40 U/L    Total Bilirubin 0.4 0.3 - 1.2 mg/dL    Total Protein 5.5 (L) 6.4 - 8.3 g/dL    Albumin 3.3 (L) 3.5 - 5.2 g/dL    Albumin/Globulin Ratio 1.5 1.0 - 2.5   CBC with Auto Differential   Result Value Ref Range    WBC 26.6 (H) 3.5 - 11.3 k/uL    RBC 3.07 (L) 4.21 - 5.77 m/uL    Hemoglobin 8.6 (L) 13.0 - 17.0 g/dL    Hematocrit 27.5 (L) 40.7 - 50.3 %    MCV 89.6 82.6 - 102.9 fL    MCH 28.0 25.2 - 33.5 pg    MCHC 31.3 28.4 - 34.8 g/dL    RDW 15.8 (H) 11.8 - 14.4 %    Platelets 417 450 - 230 k/uL    MPV 10.9 8.1 - 13.5 fL    NRBC Automated 0.1 (H) 0.0 per 100 WBC    Immature Granulocytes 10 (H) 0 %    Seg Neutrophils 78 (H) 36 - 66 %    Lymphocytes 5 (L) 24 - 44 %    Monocytes 6 1 - 7 %    Eosinophils % 1 1 - 4 %    Basophils 0 0 - 2 %    Absolute Immature Granulocyte 2.66 (H) 0.00 - 0.30 k/uL    Segs Absolute 20.74 (H) 1.8 - 7.7 k/uL    Absolute Lymph # 1.33 1.0 - 4.8 k/uL    Absolute Mono # 1.60 (H) 0.1 - 0.8 k/uL    Absolute Eos # 0.27 0.0 - 0.4 k/uL    Basophils Absolute 0.00 0.0 - 0.2 k/uL    Morphology ANISOCYTOSIS PRESENT     Morphology 1+ ACANTHOCYTES    Troponin   Result Value Ref Range    Troponin, High Sensitivity 7,513 (HH) 0 - 22 ng/L   Brain Natriuretic Peptide   Result Value Ref Range    Pro-BNP 82,715 (H) <300 pg/mL   Lipase   Result Value Ref Range    Lipase 31 13 - 60 U/L   Lactic Acid   Result Value Ref Range    Lactic Acid, Whole Blood 6.2 (H) 0.7 - 2.1 mmol/L   Protime-INR   Result Value Ref Range    Protime 11.9 9.1 - 12.3 sec    INR 1.2    APTT   Result Value Ref Range    PTT 24.0 20.5 - 30.5 sec   TSH   Result Value Ref Range    TSH 6.38 (H) 0.30 - 5.00 uIU/mL   Urinalysis with Microscopic   Result Value Ref Range    Color, UA Dark Yellow (A) Yellow    Turbidity UA Cloudy (A) Clear    Glucose, Ur NEGATIVE NEGATIVE    Bilirubin Urine NEGATIVE  Verified by ictotest. (A) NEGATIVE    Ketones, Urine TRACE (A) NEGATIVE    Specific Gravity, UA 1.022 1.005 - 1.030    Urine Hgb NEGATIVE NEGATIVE    pH, UA 5.0 5.0 - 8.0    Protein, UA 2+ (A) NEGATIVE    Urobilinogen, Urine Normal Normal    Nitrite, Urine NEGATIVE NEGATIVE    Leukocyte Esterase, Urine TRACE (A) NEGATIVE    WBC, UA 0 TO 2 0 - 5 /HPF    RBC, UA 2 TO 5 0 - 2 /HPF    Casts UA 10 TO 20 0 - 2 /LPF    Casts UA HYALINE 0 - 2 /LPF    Epithelial Cells UA 0 TO 2 0 - 5 /HPF   ELECTROLYTES PLUS   Result Value Ref Range    POC Sodium 127 (L) 138 - 146 mmol/L    POC Potassium 5.6 (H) 3.5 - 4.5 mmol/L    POC Chloride 97 (L) 98 - 107 mmol/L    POC TCO2 18 (L) 22 - 30 mmol/L    Anion Gap 13 7 - 16 mmol/L   Hemoglobin and hematocrit, blood   Result Value Ref Range    POC Hemoglobin 8.0 (L) 13.5 - 17.5 g/dL    POC Hematocrit 23 (L) 41 - 53 %   CALCIUM, IONIC (POC)   Result Value Ref Range    POC Ionized Calcium 1.11 (L) 1.15 - 1.33 mmol/L   Troponin   Result Value Ref Range    Troponin, High Sensitivity 6,838 (HH) 0 - 22 ng/L   ELECTROLYTES PLUS   Result Value Ref Range    POC Sodium 126 (L) 138 - 146 mmol/L    POC Potassium 5.0 (H) 3.5 - 4.5 mmol/L    POC Chloride 96 (L) 98 - 107 mmol/L    POC TCO2 19 (L) 22 - 30 mmol/L    Anion Gap 12 7 - 16 mmol/L   Hemoglobin and hematocrit, blood   Result Value Ref Range    POC Hemoglobin 8.3 (L) 13.5 - 17.5 g/dL    POC Hematocrit 24 (L) 41 - 53 %   CALCIUM, IONIC (POC)   Result Value Ref Range    POC Ionized Calcium 1.10 (L) 1.15 - 1.33 mmol/L   Basic Metabolic Panel   Result Value Ref Range    Glucose 164 (H) 70 - 99 mg/dL    BUN 63 (H) 8 - 23 mg/dL    Creatinine 4.30 (H) 0.70 - 1.20 mg/dL    Est, Glom Filt Rate 15 (L) >60 mL/min/1.73m2    Calcium 8.5 (L) 8.6 - 10.4 mg/dL    Sodium 126 (L) 135 - 144 mmol/L    Potassium 4.9 3.7 - 5.3 mmol/L    Chloride 93 (L) 98 - 107 mmol/L    CO2 16 (L) 20 - 31 mmol/L    Anion Gap 17 9 - 17 mmol/L   CBC   Result Value Ref Range    WBC 33.1 (HH) 3.5 - 11.3 k/uL    RBC 2.91 (L) 4.21 - 5.77 m/uL    Hemoglobin 8.2 (L) 13.0 - 17.0 g/dL    Hematocrit 25.3 (L) 40.7 - 50.3 %    MCV 86.9 82.6 - 102.9 fL    MCH 28.2 25.2 - 33.5 pg    MCHC 32.4 28.4 - 34.8 g/dL    RDW 15.6 (H) 11.8 - 14.4 %    Platelets 046 940 - 481 k/uL    MPV 10.7 8.1 - 13.5 fL    NRBC Automated 0.1 (H) 0.0 per 100 WBC   Magnesium   Result Value Ref Range    Magnesium 2.2 1.6 - 2.6 mg/dL   Phosphorus   Result Value Ref Range    Phosphorus 6.4 (H) 2.5 - 4.5 mg/dL   Troponin   Result Value Ref Range    Troponin, High Sensitivity 7,237 (HH) 0 - 22 ng/L   SPECIMEN REJECTION   Result Value Ref Range    Specimen Source . BLOOD     Ordered Test IOCAL, LACTIC, PTT     Reason for Rejection Unable to perform testing: Specimen clotted.     Calcium, Ionized   Result Value Ref Range    Calcium, Ionized 1.16 1.13 - 1.33 mmol/L   Lactic Acid   Result Value Ref Range    Lactic Acid, Whole Blood 1.9 0.7 - 2.1 mmol/L   APTT   Result Value Ref Range    PTT 21.9 20.5 - 30.5 sec   Basic Metabolic Panel   Result Value Ref Range    Glucose 135 (H) 70 - 99 mg/dL    BUN 56 (H) 8 - 23 mg/dL    Creatinine 3.48 (H) 0.70 - 1.20 mg/dL    Est, Glom Filt Rate 19 (L) >60 mL/min/1.73m2    Calcium 8.4 (L) 8.6 - 10.4 mg/dL    Sodium 131 (L) 135 - 144 mmol/L    Potassium 5.2 3.7 - 5.3 mmol/L    Chloride 94 (L) 98 - 107 mmol/L    CO2 20 20 - 31 mmol/L    Anion Gap 17 9 - 17 mmol/L   Basic Metabolic Panel   Result Value Ref Range    Glucose 123 (H) 70 - 99 mg/dL    BUN 50 (H) 8 - 23 mg/dL    Creatinine 3.11 (H) 0.70 - 1.20 mg/dL    Est, Glom Filt Rate 22 (L) >60 mL/min/1.73m2    Calcium 8.3 (L) 8.6 - 10.4 mg/dL    Sodium 129 (L) 135 - 144 mmol/L    Potassium 4.9 3.7 - 5.3 mmol/L    Chloride 95 (L) 98 - 107 mmol/L    CO2 19 (L) 20 - 31 mmol/L    Anion Gap 15 9 - 17 mmol/L   Magnesium   Result Value Ref Range    Magnesium 2.2 1.6 - 2.6 mg/dL   Magnesium   Result Value Ref Range    Magnesium 2.0 1.6 - 2.6 mg/dL   Phosphorus   Result Value Ref Range    Phosphorus 5.5 (H) 2.5 - 4.5 mg/dL   Phosphorus   Result Value Ref Range    Phosphorus 5.4 (H) 2.5 - 4.5 mg/dL   Calcium, Ionized   Result Value Ref Range    Calcium, Ionized 1.20 1.13 - 1.33 mmol/L   Calcium, Ionized   Result Value Ref Range    Calcium, Ionized 1.15 1.13 - 1.33 mmol/L   Lactic Acid   Result Value Ref Range    Lactic Acid, Whole Blood 3.1 (H) 0.7 - 2.1 mmol/L   APTT   Result Value Ref Range    PTT >120.0 (HH) 20.5 - 30.5 sec   Protime-INR   Result Value Ref Range    Protime 13.6 (H) 9.1 - 12.3 sec    INR 1.4    Lactic Acid   Result Value Ref Range    Lactic Acid, Whole Blood 4.8 (H) 0.7 - 2.1 mmol/L   Comprehensive Metabolic Panel   Result Value Ref Range    Glucose 130 (H) 70 - 99 mg/dL    BUN 46 (H) 8 - 23 mg/dL    Creatinine 2.74 (H) 0.70 - 1.20 mg/dL    Est, Glom Filt Rate 25 (L) >60 mL/min/1.73m2    Calcium 8.3 (L) 8.6 - 10.4 mg/dL    Sodium 131 (L) 135 - 144 mmol/L    Potassium 4.7 3.7 - 5.3 mmol/L    Chloride 96 (L) 98 - 107 mmol/L    CO2 20 20 - 31 mmol/L    Anion Gap 15 9 - 17 mmol/L    Alkaline Phosphatase 174 (H) 40 - 129 U/L     (H) 5 - 41 U/L    AST 94 (H) <40 U/L    Total Bilirubin 0.5 0.3 - 1.2 mg/dL    Total Protein 5.1 (L) 6.4 - 8.3 g/dL    Albumin 2.9 (L) 3.5 - 5.2 g/dL    Albumin/Globulin Ratio 1.3 1.0 - 2.5   CBC with Auto Differential   Result Value Ref Range    WBC 29.4 (H) 3.5 - 11.3 k/uL    RBC 2.74 (L) 4.21 - 5.77 m/uL    Hemoglobin 8.0 (L) 13.0 - 17.0 g/dL    Hematocrit 23.5 (L) 40.7 - 50.3 %    MCV 85.8 82.6 - 102.9 fL    MCH 29.2 25.2 - 33.5 pg    MCHC 34.0 28.4 - 34.8 g/dL    RDW 15.8 (H) 11.8 - 14.4 %    Platelets 152 405 - 310 k/uL    MPV 10.6 8.1 - 13.5 fL    NRBC Automated 0.1 (H) 0.0 per 100 WBC    Immature Granulocytes 0 0 %    Seg Neutrophils 88 (H) 36 - 66 %    Lymphocytes 10 (L) 24 - 44 %    Monocytes 2 1 - 7 %    Eosinophils % 0 (L) 1 - 4 %    Basophils 0 0 - 2 %    Absolute Immature Granulocyte 0.00 0.00 - 0.30 k/uL    Segs Absolute 25.87 (H) 1.8 - 7.7 k/uL    Absolute Lymph # 2.94 1.0 - 4.8 k/uL    Absolute Mono # 0.59 0.1 - 0.8 k/uL    Absolute Eos # 0.00 0.0 - 0.4 k/uL    Basophils Absolute 0.00 0.0 - 0.2 k/uL    Morphology ANISOCYTOSIS PRESENT    APTT   Result Value Ref Range    PTT 53.6 (H) 20.5 - 30.5 sec   Basic Metabolic Panel   Result Value Ref Range    Glucose 142 (H) 70 - 99 mg/dL    BUN 41 (H) 8 - 23 mg/dL    Creatinine 2.52 (H) 0.70 - 1.20 mg/dL    Est, Glom Filt Rate 28 (L) >60 mL/min/1.73m2    Calcium 8.6 8.6 - 10.4 mg/dL    Sodium 137 135 - 144 mmol/L    Potassium 4.8 3.7 - 5.3 mmol/L    Chloride 97 (L) 98 - 107 mmol/L    CO2 20 20 - 31 mmol/L    Anion Gap 20 (H) 9 - 17 mmol/L   Calcium, Ionized   Result Value Ref Range    Calcium, Ionized 1.15 1.13 - 1.33 mmol/L   Calcium, Ionized   Result Value Ref Range    Calcium, Ionized 1.18 1.13 - 1.33 mmol/L   Magnesium   Result Value Ref Range    Magnesium 1.9 1.6 - 2.6 mg/dL   Phosphorus   Result Value Ref Range    Phosphorus 5.3 (H) 2.5 - 4.5 mg/dL   Lactic Acid   Result Value Ref Range    Lactic Acid, Whole Blood 2.6 (H) 0.7 - 2.1 mmol/L   Lactic Acid   Result Value Ref Range    Lactic Acid, Whole Blood 4.0 (H) 0.7 - 2.1 mmol/L   APTT   Result Value Ref Range    PTT 43.5 (H) 20.5 - 30.5 sec   Basic Metabolic Panel   Result Value Ref Range    Glucose 155 (H) 70 - 99 mg/dL    BUN 39 (H) 8 - 23 mg/dL    Creatinine 2.25 (H) 0.70 - 1.20 mg/dL    Est, Glom Filt Rate 32 (L) >60 mL/min/1.73m2    Calcium 8.5 (L) 8.6 - 10.4 mg/dL    Sodium 135 135 - 144 mmol/L    Potassium 4.6 3.7 - 5.3 mmol/L    Chloride 96 (L) 98 - 107 mmol/L    CO2 17 (L) 20 - 31 mmol/L    Anion Gap 22 (H) 9 - 17 mmol/L   Calcium, Ionized   Result Value Ref Range    Calcium, Ionized 1.15 1.13 - 1.33 mmol/L   Magnesium   Result Value Ref Range    Magnesium 1.9 1.6 - 2.6 mg/dL   Phosphorus   Result Value Ref Range    Phosphorus 5.2 (H) 2.5 - 4.5 mg/dL   Lactic Acid   Result Value Ref Range    Lactic Acid, Whole Blood 3.4 (H) 0.7 - 2.1 mmol/L   Magnesium   Result Value Ref Range    Magnesium 1.9 1.6 - 2.6 mg/dL   Phosphorus   Result Value Ref Range    Phosphorus 4.8 (H) 2.5 - 4.5 mg/dL   APTT   Result Value Ref Range    .5 (HH) 20.5 - 30.5 sec   Troponin   Result Value Ref Range    Troponin, High Sensitivity 2,711 (HH) 0 - 22 ng/L   Venous Blood Gas, POC   Result Value Ref Range    pH, Ridge 7.212 (L) 7.320 - 7.430    pCO2, Ridge 44.1 41.0 - 51.0 mm Hg    pO2, Ridge 18.5 (L) 30.0 - 50.0 mm Hg    HCO3, Venous 17.7 (L) 22.0 - 29.0 mmol/L    Negative Base Excess, Ridge 10 (H) 0.0 - 2.0    O2 Sat, Ridge 20 (L) 60.0 - 85.0 %   Creatinine W/GFR Point of Care   Result Value Ref Range    POC Creatinine 4.09 (H) 0.51 - 1.19 mg/dL    GFR Comment 18 (L) >60 mL/min    GFR Non-African American 15 (L) >60 mL/min    eGFR, POC 16 mL/min/1.73m2   POCT urea (BUN)   Result Value Ref Range    POC BUN 69 (H) 8 - 26 mg/dL   Lactic Acid, POC   Result Value Ref Range    POC Lactic Acid 5.10 (H) 0.56 - 1.39 mmol/L   POCT Glucose   Result Value Ref Range    POC Glucose 88 74 - 100 mg/dL   Arterial Blood Gas, POC   Result Value Ref Range    POC pH 7.327 (L) 7.350 - 7.450    POC pCO2 35.7 35.0 - 48.0 mm Hg    POC PO2 243.2 (H) 83.0 - 108.0 mm Hg    POC HCO3 18.7 (L) 21.0 - 28.0 mmol/L    Negative Base Excess, Art 7 (H) 0.0 - 2.0    POC O2  (H) 94.0 - 98.0 %    Sample Site Arterial Line     Mode PRVC     FIO2 100.0    Creatinine W/GFR Point of Care   Result Value Ref Range    POC Creatinine 4.51 (H) 0.51 - 1.19 mg/dL    GFR Comment 16 (L) >60 mL/min    GFR Non- 13 (L) >60 mL/min    eGFR, POC 14 mL/min/1.73m2   POCT urea (BUN)   Result Value Ref Range    POC BUN 67 (H) 8 - 26 mg/dL   Lactic Acid, POC   Result Value Ref Range    POC Lactic Acid 2.21 (H) 0.56 - 1.39 mmol/L   POCT Glucose   Result Value Ref Range    POC Glucose 266 (H) 74 - 100 mg/dL   POC Glucose Fingerstick   Result Value Ref Range    POC Glucose 165 (H) 75 - 110 mg/dL   POC Glucose Fingerstick   Result Value Ref Range    POC Glucose 127 (H) 75 - 110 mg/dL   POC Glucose Fingerstick   Result Value Ref Range    POC Glucose 123 (H) 75 - 110 mg/dL   Arterial Blood Gas, POC   Result Value Ref Range    POC pH 7.294 (L) 7.350 - 7.450    POC pCO2 37.6 35.0 - 48.0 mm Hg    POC PO2 99.8 83.0 - 108.0 mm Hg    POC HCO3 18.2 (L) 21.0 - 28.0 mmol/L    Negative Base Excess, Art 8 (H) 0.0 - 2.0    POC O2 SAT 97 94.0 - 98.0 %    Roger Test NOT APPLICABLE     Sample Site Arterial Line     FIO2 50.0     Pt Temp 32.5     POC pH Temp 7.36     POC pCO2 Temp 31 mm Hg    POC pO2 Temp 76 mm Hg   POC Glucose Fingerstick   Result Value Ref Range    POC Glucose 119 (H) 75 - 110 mg/dL   Arterial Blood Gas, POC   Result Value Ref Range    POC pH 7.349 (L) 7.350 - 7.450    POC pCO2 38.6 35.0 - 48.0 mm Hg    POC PO2 153.1 (H) 83.0 - 108.0 mm Hg    POC HCO3 21.3 21.0 - 28.0 mmol/L    Negative Base Excess, Art 4 (H) 0.0 - 2.0    POC O2 SAT 99 (H) 94.0 - 98.0 %    O2 Device/Flow/% Adult Ventilator     Roger Test NEGATIVE     Sample Site Arterial Line     FIO2 50.0    POCT Glucose   Result Value Ref Range    POC Glucose 123 (H) 74 - 100 mg/dL   EKG 12 Lead   Result Value Ref Range    Ventricular Rate 57 BPM    Atrial Rate 57 BPM    P-R Interval 154 ms    QRS Duration 126 ms    Q-T Interval 466 ms    QTc Calculation (Bazett) 453 ms    P Axis 59 degrees    R Axis -29 degrees    T Axis 149 degrees   EKG 12 Lead   Result Value Ref Range    Ventricular Rate 58 BPM    Atrial Rate 58 BPM    P-R Interval 160 ms    QRS Duration 112 ms    Q-T Interval 426 ms    QTc Calculation (Bazett) 418 ms    P Axis 62 degrees    R Axis -30 degrees    T Axis 180 degrees   EKG 12 Lead   Result Value Ref Range    Ventricular Rate 59 BPM    Atrial Rate 59 BPM    P-R Interval 166 ms    QRS Duration 124 ms    Q-T Interval 444 ms    QTc Calculation (Bazett) 439 ms    P Axis 60 degrees    R Axis -19 degrees    T Axis 172 degrees       Radiology/Imaging:  XR ABDOMEN (KUB) (SINGLE AP VIEW)   Final Result Endotracheal tube is in appropriate position. NG tube is in appropriate   position. Right central line distal tip is within the inferior aspect of right atrium. Bilateral femoral lines are in place with the right femoral line terminating   at the cavoatrial junction and left femoral line terminating in the mid   pelvis. Unchanged extensive consolidative changes of left lung and small left pleural   effusion and unchanged increased interstitial markings of both lungs. .      Unchanged 9 mm linear density projecting over left lower lung field. It is   unclear whether the finding is aspirated into the left lung or is in the   overlying soft tissues of the chest wall. If there is need for further   evaluation chest CT can be obtained. XR CHEST (SINGLE VIEW FRONTAL)   Final Result      Endotracheal tube is in appropriate position. NG tube is in appropriate   position. Right central line distal tip is within the inferior aspect of right atrium. Bilateral femoral lines are in place with the right femoral line terminating   at the cavoatrial junction and left femoral line terminating in the mid   pelvis. Unchanged extensive consolidative changes of left lung and small left pleural   effusion and unchanged increased interstitial markings of both lungs. .      Unchanged 9 mm linear density projecting over left lower lung field. It is   unclear whether the finding is aspirated into the left lung or is in the   overlying soft tissues of the chest wall. If there is need for further   evaluation chest CT can be obtained. XR ABDOMEN (KUB) (SINGLE AP VIEW)   Final Result   1. Endotracheal tube remains in appropriate position. 2.  Enteric tube terminates at the level the body of the stomach. 3.  Femoral lines in place, as described.       4.  Bilateral airspace disease and small left effusion again noted, although   less prominent in the interval.         XR CHEST PORTABLE Final Result   1. Endotracheal tube remains in appropriate position. 2.  Enteric tube terminates at the level the body of the stomach. 3.  Femoral lines in place, as described. 4.  Bilateral airspace disease and small left effusion again noted, although   less prominent in the interval.         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 Old right basal ganglionic lacune. No acute intracranial abnormality. XR CHEST PORTABLE   Final Result   1. Endotracheal tube measures approximately 6-7 cm above the expected   location the joie, somewhat difficult to see given overlapping lines leads   and median sternotomy wires. 2. Diffuse parenchymal infiltrates are seen in the perihilar regions and   throughout the lungs bilaterally, which may represent diffuse pulmonary edema   and/or multifocal pneumonia. 3. Postoperative changes from prior open heart surgery, with coronary stents   noted. 4. Probable 5th anterolateral right rib fracture with minimal displacement. No pneumothorax.              ASSESSMENT:     Patient Active Problem List    Diagnosis Date Noted    Encounter for palliative care 10/11/2022    Cardiac arrest ([facility] 10/10/2022    Hyperkalemia 31/85/0313    Metabolic acidosis 28/92/0830    Hyperparathyroidism ([facility] 06/16/2017    CKD (chronic kidney disease) stage 3, GFR 30-59 ml/min ([facility]) 01/15/2016    DM (diabetes mellitus) ([facility] 01/15/2016    Hx of CABG 01/15/2016    Benign essential HTN 01/15/2016        PLAN:       PLAN/MEDICAL DECISION MAKING:  Neurologic:   Minimally responsive  Neurochecks per protocol  Neuro critical care consult  Hypothermia protocol  No GGT  Cardiovascular:  Levophed  MAP goal 65  Heparin GTT due to elevated troponins  Pulmonary:  Maintain oxygen sats >92%  Pulmonary toilet  Vent Information  Ventilator ID: [id number]  Equipment Changed: HME  GI/Nutrition    Ulcer Prophylaxis: PPI not indicated  Diet:Diet NPO  ADULT TUBE FEEDING; Orogastric; Renal Formula; Continuous; 10; No; 30; Q 4 hours  Renal/Fluid/Electrolyte  Sodium bicarb 75 mEq and sodium chloride 0.45 at 100 mL/h  I/O: In: 4804.3 [I.V.:4416.2]  Out: 4460 [Urine:20]  Neurology on board  Monitor electrolytes, replace PRN   ID  WBC:   Lab Results   Component Value Date    WBC 29.4 (H) 10/11/2022     Tmax: Temp (24hrs), Av.5 °F (33.1 °C), Min:91.4 °F (33 °C), Max:91.6 °F (33.1 °C)    Antimicrobials: Unasyn  Hematology:  Recent Labs     10/10/22  0342 10/10/22  0759 10/11/22  034   HGB 8.6* 8.2* 8.0*    stable  Endocrine:   glucose controlled - most recent BGL is   Recent Labs     10/11/22  0341 10/11/22  0929 10/11/22  1530   GLUCOSE 130* 142* 155*     DVT Prophylaxis  Heparin  Discharge Needs:  PT, OT, ST, SW, and Case Management      CODE STATUS: DNR-CCA      DISPOSITION:  [x] To remain ICU: Mechanical ventilation  [] OK for out of ICU from [facility], MD  Emergency Medicine [address] [place] [address]  10/11/2022, 6:02 PM EDT  Attending Physician Statement  I have discussed the care of Sloan Thompson., including pertinent history and exam findings,  with the resident. I have seen and examined the patient and the key elements of all parts of the encounter have been performed by me. I agree with the assessment, plan and orders as documented by the resident with additions . Continue hypothermia protocol   Supportive care            Total critical care time caring for this patient with life threatening, unstable organ failure, including direct patient contact, management of life support systems, review of data including imaging and labs, discussions with other team members and physicians at least 27   Min so far today, excluding procedures.             Electronically signed by Kalyn Wynne MD on   10/11/22 at 7:23 PM EDT    Please note that this chart was generated using voice recognition Dragon dictation software. Although every effort was made to ensure the accuracy of this automated transcription, some errors in transcription may have occurred.

## 2022-10-11 NOTE — PROCEDURES
PROCEDURE NOTE - ARTERIAL LINE PLACEMENT    PATIENT NAME: Marely Valencia Sr.  MEDICAL RECORD NO. [id number]  DATE: 10/10/2022  ATTENDING PHYSICIAN:  Dr. Adeel Newton DIAGNOSIS:  Need for blood pressure monitoring  POSTOPERATIVE DIAGNOSIS:  Same  PROCEDURE PERFORMED: Left Femoral Arterial Line Insertion  PERFORMING PHYSICIAN:  Marlen Mcdonald DO  ESTIMATED BLOOD LOSS:  Less than 25 ml  COMPLICATIONS:  None immediately appreciated. DISCUSSION:  Marely Valencia Sr. is a 60 y.o. male who requires invasive pressure monitoring. The history and physical examination were reviewed and confirmed. CONSENT: Unable to be obtained due to the condition of the patient. PROCEDURE:  A timeout was initiated by the bedside nurse and was confirmed by those present. The patient was placed in a supine position. The skin overlying the Left Femoral was prepped with chlorhexadine. Through this region, the introducer needle through catheter was inserted into left femoral artery until pulsatile bright blood was seen in collection tubing. Guidewire was advanced with no resistance. Catheter was advanced into the artery, wire was pulled with brisk bleeding noted. Pressure monitoring setup was connected to the catheter, it aspirated and flushed easily. The catheter was secured to the wrist with 3-0 silk. No immediate complication was evident. All sponge, instrument and needle counts were correct at the completion of the procedure.       Marlen Mcdonald DO  1:03 AM, 10/10/22

## 2022-10-11 NOTE — PROGRESS NOTES
Nephrology Progress Note        Subjective: The patient is seen and examined on CVVHD. The patient continues on continuous hemodialysis in the setting of ongoing vasopressor use with the patient on vasopressin and also on Levophed at 18 mcg/min. He continues intubated and sedated and on the ventilator. The patient has a tunneled hemodialysis catheter in place for access. The patient continues on bicarbonate containing fluids. His serum bicarbonate level is 20. His sodium is 137 with potassium 4.8 chloride 97 and CO2 20. After further investigation, to my knowledge, the patient had not been accepted to an outpatient dialysis facility and it is unclear who truly is responsible for the patient's dialysis. He was last seen by our group in 2020 prior to this hospitalization. Patient is stable on dialysis. Access cannulated without problems. No new issues overnite. Stable hemodynamics.          Objective:  CURRENT TEMPERATURE:  Temp: (!) 91.6 °F (33.1 °C)  MAXIMUM TEMPERATURE OVER 24HRS:  Temp (24hrs), Av.8 °F (33.8 °C), Min:91.4 °F (33 °C), Max:96.6 °F (35.9 °C)    CURRENT RESPIRATORY RATE:  Resp: 18  CURRENT PULSE:  Heart Rate: 62  CURRENT BLOOD PRESSURE:  BP: (!) 119/59  24HR BLOOD PRESSURE RANGE:  Systolic (87TUI), UEC:401 , Min:112 , XXD:883   ; Diastolic (83JKU), KQQ:06, Min:42, Max:59    24HR INTAKE/OUTPUT:    Intake/Output Summary (Last 24 hours) at 10/11/2022 1008  Last data filed at 10/11/2022 4078  Gross per 24 hour   Intake 3660.49 ml   Output 3270 ml   Net 390.49 ml     Patient Vitals for the past 96 hrs (Last 3 readings):   Weight   10/11/22 0117 163 lb 9.3 oz (74.2 kg)   10/10/22 1045 166 lb 3.6 oz (75.4 kg)   10/10/22 0315 165 lb (74.8 kg)         Physical Exam:  General appearance: Intubated and sedated and on the ventilator and seen on CVVHD with CVVHD orders reviewed   Skin: warm and dry, no rash or erythema  Eyes: conjunctivae pale and sclera anicteric  ENT: Oral endotracheal tube in place  Neck:  No JVD, midline trachea, no accessory muscle use  Pulmonary: Bilateral air entry with equal breath sounds   cardiovascular: Regular rate and rhythm with positive S1 and S2 and no rubs Abdomen: Obese and soft and mildly distended with active bowel sounds extremities: no cyanosis, mild peripheral edema    Access:  previous permacath    Current Medications:    fentaNYL 50 mcg/mL 50 mL infusion, Continuous  norepinephrine (LEVOPHED) 16 mg in sodium chloride 0.9 % 250 mL infusion, Continuous  sodium chloride flush 0.9 % injection 5-40 mL, 2 times per day  sodium chloride flush 0.9 % injection 5-40 mL, PRN  0.9 % sodium chloride infusion, PRN  polyethylene glycol (GLYCOLAX) packet 17 g, Daily PRN  acetaminophen (TYLENOL) tablet 650 mg, Q6H PRN   Or  acetaminophen (TYLENOL) suppository 650 mg, Q6H PRN  pantoprazole (PROTONIX) 40 mg in sodium chloride (PF) 10 mL injection, Daily  ampicillin-sulbactam (UNASYN) 1500 mg in 50 mL NS IVPB minibag, Q6H  heparin (porcine) injection 4,000 Units, PRN  heparin (porcine) injection 2,000 Units, PRN  vasopressin 20 Units in dextrose 5 % 100 mL infusion, Continuous  midazolam (VERSED) 1 mg/mL in D5W infusion, Continuous  potassium chloride 20 mEq/50 mL IVPB (Central Line), PRN  magnesium sulfate 1000 mg in dextrose 5% 100 mL IVPB, PRN  calcium gluconate 1000 mg in sodium chloride 50 mL, PRN   Or  calcium gluconate 2000 mg in sodium chloride 100 mL, PRN   Or  calcium gluconate 3,000 mg in dextrose 5 % 100 mL IVPB, PRN   Or  calcium gluconate 4,000 mg in dextrose 5 % 100 mL IVPB, PRN  sodium phosphate 6 mmol in sodium chloride 0.9 % 250 mL IVPB, PRN   Or  sodium phosphate 12 mmol in sodium chloride 0.9 % 250 mL IVPB, PRN   Or  sodium phosphate 18 mmol in sodium chloride 0.9 % 500 mL IVPB, PRN   Or  sodium phosphate 24 mmol in sodium chloride 0.9 % 500 mL IVPB, PRN  prismaSATE BK 0/3.5 5,000 mL with potassium chloride 10 mEq solution, Continuous  sodium bicarbonate 75 mEq in sodium chloride 0.45 % 1,000 mL infusion, Continuous  heparin 25,000 units in dextrose 5 % 250 mL infusion (rate based), Continuous  ondansetron (ZOFRAN-ODT) disintegrating tablet 4 mg, Q8H PRN   Or  ondansetron (ZOFRAN) injection 4 mg, Q6H PRN  chlorhexidine (PERIDEX) 0.12 % solution 15 mL, BID  heparin (porcine) injection 1,700 Units, PRN  heparin (porcine) injection 1,800 Units, PRN      Labs:   CBC:   Recent Labs     10/10/22  0342 10/10/22  0759 10/11/22  0341   WBC 26.6* 33.1* 29.4*   RBC 3.07* 2.91* 2.74*   HGB 8.6* 8.2* 8.0*   HCT 27.5* 25.3* 23.5*    347 334   MPV 10.9 10.7 10.6      BMP:   Recent Labs     10/10/22  1644 10/10/22  2131 10/11/22  0341   * 129* 131*   K 5.2 4.9 4.7   CL 94* 95* 96*   CO2 20 19* 20   BUN 56* 50* 46*   CREATININE 3.48* 3.11* 2.74*   GLUCOSE 135* 123* 130*   CALCIUM 8.4* 8.3* 8.3*        Phosphorus:    Recent Labs     10/10/22  1644 10/10/22  2131 10/11/22  0341   PHOS 5.5* 5.4* 4.8*     Magnesium:   Recent Labs     10/10/22  1644 10/10/22  2131 10/11/22  0341   MG 2.2 2.0 1.9     Albumin:   Recent Labs     10/10/22  0342 10/11/22  0341   LABALBU 3.3* 2.9*       Dialysis bath:      Radiology:  Reviewed as available. Assessment:  1 acute kidney injury versus newly diagnosed ESRD, dialysis dependent and currently requiring CVVHD in the setting of cardiogenic shock status postcardiac arrest.  The patient had not yet been placed and it is unclear who his primary nephrologist is at this time. He last saw our group in February 2020 so he has not followed regularly with our group. 2.cardiogenic shock, vasopressor dependent  3 status post out-of-hospital cardiac arrest  4 metabolic acidosis, improving with bicarbonate containing IV fluids and dialysis  5. ANEMIA OF CHRONIC DISEASE:   6.improving hyponatremia  7. Ventilator dependent acute respiratory failure  8. Hyperkalemia, improving with dialysis    Plan:  1. Weight removal and dialysis orders reviewed. Patient currently will be kept even. 2.  Wean vasopressors as tolerated  3. I have adjusted the patient dialysate fluid composition to account for the current electrolyte panels  4. Follow up labs ordered. 5.  Continue CVVHD    I have spoken directly with the patient's ICU nurse at the bedside regarding the nephrology plan of care. Please do not hesitate to call with questions.     Electronically signed by Jake Weeks MD on 10/11/2022 at 10:08 AM

## 2022-10-11 NOTE — PROGRESS NOTES
Per report patient reached target temp of 33C at 1600 10/10/2022, no paralytic needed per report. Due to re-warm at 1600 10/11/2022.

## 2022-10-11 NOTE — PROGRESS NOTES
[facility] Wound Ostomy  Nurse  Consult Note       NAME:  Florentino Yu SrYoselyn  MEDICAL RECORD NUMBER:  [id number]  AGE: 60 y.o. GENDER: male  : 1960  TODAY'S DATE:  10/11/2022    Subjective   Reason for [address] Nurse Evaluation and Assessment:   Lower extremity wounds    Wound History:   Admitted after cardia arrest          PAST MEDICAL HISTORY        Diagnosis Date    Chronic kidney disease     CKD (chronic kidney disease)     Diabetes (HCC)     Hyperlipidemia     Hypertension     Ischemic heart disease     MRSA (methicillin resistant staph aureus) culture positive     Neuropathy     Sleep apnea        PAST SURGICAL HISTORY    Past Surgical History:   Procedure Laterality Date    APPENDECTOMY      CARDIAC CATHETERIZATION      EYE SURGERY      TRIGGER FINGER RELEASE         FAMILY HISTORY    Family History   Problem Relation Age of Onset    Stroke Mother     Diabetes Mother     Other Mother         pace maker    Heart Disease Father        SOCIAL HISTORY    Social History     Tobacco Use    Smoking status: Former    Smokeless tobacco: Never   Substance Use Topics    Alcohol use: No       ALLERGIES    No Known Allergies    MEDICATIONS    No current facility-administered medications on file prior to encounter.      Current Outpatient Medications on File Prior to Encounter   Medication Sig Dispense Refill    Cholecalciferol (VITAMIN D3) 2000 units CAPS Take by mouth every other day      sodium polystyrene (SPS) 15 GM/60ML suspension Take 60 mLs by mouth once for 1 dose 1 Bottle 0    aspirin 81 MG tablet Take 81 mg by mouth daily      ascorbic acid (VITAMIN C) 500 MG tablet Take 500 mg by mouth daily      ergocalciferol (DRISDOL) 64433 UNITS capsule Take 1 capsule by mouth once a week For 8 weeks, then 1 capsule by mouth once a month for 4 months 12 capsule 0    Aspirin Buf,ScOus-KfHgu-YwWzp, (BUFFERED ASPIRIN) 325 MG TABS Take 325 mg by mouth daily      atorvastatin (LIPITOR) 40 MG tablet Take 40 mg by mouth daily furosemide (LASIX) 40 MG tablet Take 40 mg by mouth daily      insulin lispro (HUMALOG) 100 UNIT/ML injection vial Inject into the skin Pt has a pump      hydrALAZINE (APRESOLINE) 50 MG tablet Take 50 mg by mouth Takes one tablet in am and two tablets in evening      isosorbide mononitrate (IMDUR) 60 MG CR tablet Take 60 mg by mouth daily      insulin glargine (LANTUS) 100 UNIT/ML injection vial Inject 20 Units into the skin nightly      lisinopril (PRINIVIL;ZESTRIL) 5 MG tablet Take 2.5 mg by mouth daily       Multiple Vitamins-Minerals (THERAPEUTIC MULTIVITAMIN-MINERALS) tablet Take 1 tablet by mouth daily      nitroGLYCERIN (NITROSTAT) 0.4 MG SL tablet Place 0.4 mg under the tongue every 5 minutes as needed for Chest pain      clopidogrel (PLAVIX) 75 MG tablet Take 75 mg by mouth daily         Objective    BP (!) 107/40   Pulse 63   Temp (!) 91.6 °F (33.1 °C) (Esophageal)   Resp 18   Ht 5' 2\" (1.575 m)   Wt 163 lb 9.3 oz (74.2 kg)   SpO2 100%   BMI 29.92 kg/m²     LABS:  WBC:    Lab Results   Component Value Date/Time    WBC 29.4 10/11/2022 03:41 AM     H/H:    Lab Results   Component Value Date/Time    HGB 8.0 10/11/2022 03:41 AM    HCT 23.5 10/11/2022 03:41 AM     PTT:    Lab Results   Component Value Date/Time    APTT 115.5 10/11/2022 12:41 PM   [APTT}  PT/INR:    Lab Results   Component Value Date/Time    PROTIME 13.6 10/10/2022 04:44 PM    INR 1.4 10/10/2022 04:44 PM       Assessment   Shiv Risk Score: Shiv Scale Score: 11    Patient Active Problem List   Diagnosis Code    CKD (chronic kidney disease) stage 3, GFR 30-59 ml/min (HCC) N18.30    DM (diabetes mellitus) (HCC) E11.9    Hx of CABG Z95.1    Benign essential HTN I10    Hyperparathyroidism ([address] E21.3    Cardiac arrest ([address] I46.9    Hyperkalemia K13.1    Metabolic acidosis I47.60    Encounter for palliative care Z51.5       Measurements:     10/11/22 1330   Wound 10/10/22 Leg Right; Lower; Lateral and pretibial   Date First Assessed/Time First Assessed: 10/10/22 0700   Present on Hospital Admission: Yes  Location: Leg  Wound Location Orientation: Right; Lower; Lateral  Wound Description (Comments): and pretibial   Wound Image     Wound Etiology Venous   Dressing Status New dressing applied   Wound Cleansed Cleansed with saline   Dressing/Treatment Petroleum impregnated gauze;ABD;Roll gauze   Dressing Change Due 10/12/22   Wound Assessment Dry;Pink/red;Subcutaneous   Drainage Amount Scant   Drainage Description Serosanguinous   Nicki-wound Assessment   (limbs cold d/t hypothermia tx.)   Wound 10/11/22 Pretibial Left   Date First Assessed: 10/11/22   Present on Hospital Admission: Yes  Primary Wound Type: (c) Other (comment)  Location: Pretibial  Wound Location Orientation: Left   Wound Etiology Traumatic   Dressing Status Intact   Dressing/Treatment Foam   Wound Assessment Devitalized tissue  (peeling epidermis)     Chronic venous stasis ulcers and possible mixed arterial to the right lower leg. Left leg reportedly had IO infiltration of levophed that was treated with regitine; silicone bordered foam over a pale, cool area of peeling epidermis; will monitior        Response to treatment:  non responsive on ventilator with hypothermia tx in progress. Plan   Plan of Care: Wound 10/10/22 Leg Right; Lower; Lateral and pretibial-Dressing/Treatment: Other (comment) (oil emulsion dressing)  Wound 10/11/22 Pretibial Left-Dressing/Treatment: Foam    RIGHT LOWER LEG:  Apply oil emulsion gauze, cover with ABD and secure with roll gauze.   Change once daily,    LEFT LEG:  Continue silicone bordered foam; observe for progression of tissue injury    Specialty Bed Required :    [] Low Air Loss   [x] Pressure Redistribution  [] Fluid Immersion  [] Bariatric  [] Total Pressure Relief  [] Other:     Current Diet: Diet NPO  ADULT TUBE FEEDING; Orogastric; Renal Formula; Continuous; 10; No; 30; Q 4 hours       KAYLEN ALONSO RN BSN, [employer]

## 2022-10-11 NOTE — PROGRESS NOTES
Ok to wean off Versed/Fentanyl gtts as tolerated d/t concern for withdrawing from pain vs posturing. Plan to get neuro-crit involved in the morning per Dr. Joey Batista.

## 2022-10-11 NOTE — PLAN OF CARE
Problem: Discharge Planning  Goal: Discharge to home or other facility with appropriate resources  10/10/2022 2054 by Vero Williamson RN  Outcome: Progressing  10/10/2022 1103 by Cheng Natarajan RN  Outcome: Progressing  Flowsheets (Taken 10/10/2022 0800)  Discharge to home or other facility with appropriate resources: Identify barriers to discharge with patient and caregiver     Problem: Respiratory - Adult  Goal: Achieves optimal ventilation and oxygenation  10/10/2022 2054 by Vero Williamson RN  Outcome: Progressing  10/10/2022 1103 by Cheng Natarajan RN  Outcome: Progressing  10/10/2022 0739 by Herminia De La Cruz RCP  Outcome: Progressing     Problem: Chronic Conditions and Co-morbidities  Goal: Patient's chronic conditions and co-morbidity symptoms are monitored and maintained or improved  10/10/2022 2054 by Vero Williamson RN  Outcome: Progressing  10/10/2022 1103 by Cheng Natarajan RN  Outcome: Progressing  Flowsheets (Taken 10/10/2022 0800)  Care Plan - Patient's Chronic Conditions and Co-Morbidity Symptoms are Monitored and Maintained or Improved: Monitor and assess patient's chronic conditions and comorbid symptoms for stability, deterioration, or improvement     Problem: Pain  Goal: Verbalizes/displays adequate comfort level or baseline comfort level  10/10/2022 2054 by Vero Williamson RN  Outcome: Progressing  Flowsheets  Taken 10/10/2022 1600 by Cheng Natarajan RN  Verbalizes/displays adequate comfort level or baseline comfort level: Assess pain using appropriate pain scale  Taken 10/10/2022 1200 by Cheng Natarajan RN  Verbalizes/displays adequate comfort level or baseline comfort level:   Assess pain using appropriate pain scale   Encourage patient to monitor pain and request assistance  10/10/2022 1103 by Cheng Natarajan RN  Outcome: Progressing     Problem: Safety - Adult  Goal: Free from fall injury  10/10/2022 2054 by Vero Williamson RN  Outcome: Progressing  10/10/2022 1103 by Cheng Natarajan RN  Outcome: Progressing     Problem: ABCDS Injury Assessment  Goal: Absence of physical injury  10/10/2022 2054 by Aram Hopper RN  Outcome: Progressing  10/10/2022 1103 by Alexa Reinoso RN  Outcome: Progressing     Problem: Skin/Tissue Integrity  Goal: Absence of new skin breakdown  Description: 1. Monitor for areas of redness and/or skin breakdown  2. Assess vascular access sites hourly  3. Every 4-6 hours minimum:  Change oxygen saturation probe site  4. Every 4-6 hours:  If on nasal continuous positive airway pressure, respiratory therapy assess nares and determine need for appliance change or resting period. 10/10/2022 2054 by Aram Hopper RN  Outcome: Progressing  10/10/2022 1103 by Alexa Reinoso RN  Outcome: Progressing     Problem: Safety - Medical Restraint  Goal: Remains free of injury from restraints (Restraint for Interference with Medical Device)  Description: INTERVENTIONS:  1. Determine that other, less restrictive measures have been tried or would not be effective before applying the restraint  2. Evaluate the patient's condition at the time of restraint application  3. Inform patient/family regarding the reason for restraint  4.  Q2H: Monitor safety, psychosocial status, comfort, nutrition and hydration  Outcome: Progressing  Flowsheets  Taken 10/10/2022 1800 by Alexa Reinoso RN  Remains free of injury from restraints (restraint for interference with medical device): Every 2 hours: Monitor safety, psychosocial status, comfort, nutrition and hydration  Taken 10/10/2022 1600 by Alexa Reinoso RN  Remains free of injury from restraints (restraint for interference with medical device): Every 2 hours: Monitor safety, psychosocial status, comfort, nutrition and hydration  Taken 10/10/2022 1400 by Alexa Reinoso RN  Remains free of injury from restraints (restraint for interference with medical device): Every 2 hours: Monitor safety, psychosocial status, comfort, nutrition and hydration  Taken 10/10/2022 1238 by Ivon Barrett RN  Remains free of injury from restraints (restraint for interference with medical device): Every 2 hours: Monitor safety, psychosocial status, comfort, nutrition and hydration     Problem: Confusion  Goal: Confusion, delirium, dementia, or psychosis is improved or at baseline  Description: INTERVENTIONS:  1. Assess for possible contributors to thought disturbance, including medications, impaired vision or hearing, underlying metabolic abnormalities, dehydration, psychiatric diagnoses, and notify attending LIP  2. [place] high risk fall precautions, as indicated  3. Provide frequent short contacts to provide reality reorientation, refocusing and direction  4. Decrease environmental stimuli, including noise as appropriate  5. Monitor and intervene to maintain adequate nutrition, hydration, elimination, sleep and activity  6. If unable to ensure safety without constant attention obtain sitter and review sitter guidelines with assigned personnel  7.  Initiate Psychosocial CNS and Spiritual Care consult, as indicated  Outcome: Progressing

## 2022-10-11 NOTE — ED PROVIDER NOTES
[address]  ED  Emergency Department Encounter  Emergency Medicine Resident     Pt Name: Angelina Munoz MRN: [id number]  Birthdate 1960  Date of evaluation: 10/11/22  PCP:  Shanique Montejo       Chief Complaint   Patient presents with    Cardiac Arrest     Arrives per [facility] from scene. Per [facility], pt was cardiac arrest per EMS. Pt with ROSC and transported to [facility] to be transported to [facility] ER. Pt received 2 rounds of epi, unknown amounts of fentanyl and versed. Pt received 1 amp Bicarb and 1 amp calcium chloride. Pt arrives intubated, no sedation. HISTORY OFPRESENT ILLNESS  (Location/Symptom, Timing/Onset, Context/Setting, Quality, Duration, Modifying Factors,Severity.)      Angelina Munoz is a 60 y.o. male with past medical history of insulin dependent diabetes, hypertension, hyperlipidemia, chronic kidney disease with new onset renal failure, recently started on dialysis, coronary artery disease status post CABG in the early 90s and recent cardiac arrest 10/1/2022 with implanted LifeVest placed and recent cardiac catheterization at [facility] with stay at [facility] ICU, just discharged a few days ago who presents via [employer] from scene for cardiac arrest.  [facility] reports that patient underwent an unknown amount of rounds of CPR with unknown rhythm but did receive 2 rounds of epi, 1 dose of bicarb and 1 dose of calcium. ROSC was obtained and patient was intubated on scene. During transport for [facility], patient became hypotensive and required push dose epinephrine 10 mcg x 4. He was then started on Levophed. Patient also was given a dose of fentanyl and Versed for sedation in route. Per family, patient has been ill since his discharge from the hospital.  Family reports that he was being treated for pneumonia as well as an infection to his right leg. He also had a cardiac arrest and had several changes to his cardiac medications.   Wife reports that he has multiple stents but was not sure if he had a stent placed during his most recent visit. She states that he was recently started on dialysis and had his catheter placed on Celexa 6/22. He is due for dialysis today. Wife states that patient was having difficulty breathing and complaining of fatigue when getting out of bed today. He then slumped over and fell to the ground. Family was unsure if he hit his head. He is taking aspirin and Plavix. They state he was responsive for them until EMS arrived. No CPR was performed by family. On arrival, patient is intubated but not sedated. He is hypotensive on low-dose Levophed. He is pale, cool and clammy. Bradycardic rhythm. Is withdrawing to pain in all 4 extremities. No history able to be obtained from the patient. PAST MEDICAL / SURGICAL / SOCIAL / FAMILY HISTORY      has a past medical history of Chronic kidney disease, CKD (chronic kidney disease), Diabetes ([address] Hyperlipidemia, Hypertension, Ischemic heart disease, MRSA (methicillin resistant staph aureus) culture positive, Neuropathy, and Sleep apnea. has a past surgical history that includes Appendectomy; eye surgery; Cardiac catheterization; and Finger trigger release. Social:  reports that he has quit smoking. He has never used smokeless tobacco. He reports that he does not drink alcohol. Family Hx:   Family History   Problem Relation Age of Onset    Stroke Mother     Diabetes Mother     Other Mother         pace maker    Heart Disease Father         Allergies:  Patient has no known allergies. Home Medications:  Prior to Admission medications    Medication Sig Start Date End Date Taking?  Authorizing Provider   Cholecalciferol (VITAMIN D3) 2000 units CAPS Take by mouth every other day    Historical Provider, MD   sodium polystyrene (SPS) 15 GM/60ML suspension Take 60 mLs by mouth once for 1 dose 6/15/17 6/15/17  Maxim Michelle MD   aspirin 81 MG tablet Take 81 mg by mouth daily    Historical Provider, MD   ascorbic acid (VITAMIN C) 500 MG tablet Take 500 mg by mouth daily    Historical Provider, MD   ergocalciferol (DRISDOL) 24144 UNITS capsule Take 1 capsule by mouth once a week For 8 weeks, then 1 capsule by mouth once a month for 4 months 1/11/17   Lizandro Johnson MD   Aspirin Buf,JfEel-XkAas-PfVpl, (BUFFERED ASPIRIN) 325 MG TABS Take 325 mg by mouth daily    Historical Provider, MD   atorvastatin (LIPITOR) 40 MG tablet Take 40 mg by mouth daily    Historical Provider, MD   furosemide (LASIX) 40 MG tablet Take 40 mg by mouth daily    Historical Provider, MD   insulin lispro (HUMALOG) 100 UNIT/ML injection vial Inject into the skin Pt has a pump    Historical Provider, MD   hydrALAZINE (APRESOLINE) 50 MG tablet Take 50 mg by mouth Takes one tablet in am and two tablets in evening    Historical Provider, MD   isosorbide mononitrate (IMDUR) 60 MG CR tablet Take 60 mg by mouth daily    Historical Provider, MD   insulin glargine (LANTUS) 100 UNIT/ML injection vial Inject 20 Units into the skin nightly    Historical Provider, MD   lisinopril (PRINIVIL;ZESTRIL) 5 MG tablet Take 2.5 mg by mouth daily     Historical Provider, MD   Multiple Vitamins-Minerals (THERAPEUTIC MULTIVITAMIN-MINERALS) tablet Take 1 tablet by mouth daily    Historical Provider, MD   nitroGLYCERIN (NITROSTAT) 0.4 MG SL tablet Place 0.4 mg under the tongue every 5 minutes as needed for Chest pain    Historical Provider, MD   clopidogrel (PLAVIX) 75 MG tablet Take 75 mg by mouth daily    Historical Provider, MD       REVIEW OFSYSTEMS    (2-9 systems for level 4, 10 or more for level 5)      Review of Systems   Unable to perform ROS: Intubated     PHYSICAL EXAM   (up to 7 for level 4, 8 or more forlevel 5)      INITIAL VITALS:   Vitals:    10/10/22 1845 10/10/22 2000 10/10/22 2030 10/10/22 2307   BP:  (!) 112/42     Pulse: 55  56    Resp: 11  17    Temp:  (!) 91.4 °F (33 °C)     TempSrc:  Esophageal     SpO2: 100% 99%   Weight:       Height:            Physical Exam  Vitals and nursing note reviewed. Constitutional:       Appearance: He is ill-appearing. Comments: Intubated, sedated, ill-appearing, pale, cool, clammy   HENT:      Head: Normocephalic and atraumatic. Nose: Nose normal.      Mouth/Throat:      Mouth: Mucous membranes are dry. Pharynx: Oropharynx is clear. Eyes:      Conjunctiva/sclera: Conjunctivae normal.      Comments: Pupils are 2 mm equal and reactive bilaterally   Cardiovascular:      Rate and Rhythm: Normal rate and regular rhythm. Pulses: Normal pulses. Heart sounds: No murmur heard. No friction rub. No gallop. Comments: Midline sternotomy scar  Pulmonary:      Comments: Coarse breath sounds throughout, mechanically ventilated  Abdominal:      General: There is distension. Palpations: Abdomen is soft. Comments: Abdomen is soft, mildly distended. There is an insulin pump in the left lower quadrant   Musculoskeletal:      Cervical back: Normal range of motion and neck supple. Right lower leg: Edema present. Left lower leg: Edema present. Comments: Edema to the bilateral lower extremities from feet to proximal tibia. There is a wound to the right lower leg which appears chronic. No erythema, fluctuance, warmth. There is weeping from the skin of both lower extremities and they are cool to the touch. DP pulses are not palpable but are dopplerable   Skin:     General: Skin is warm and dry. Capillary Refill: Capillary refill takes less than 2 seconds. Findings: No rash. Neurological:      Mental Status: He is alert. Comments: Pupillary, cough, gag reflexes intact. Patient withdraws to pain in all 4 extremities.   Bite ET tube when sedation paused       DIFFERENTIAL  DIAGNOSIS     DDX: Cardiac arrest, STEMI, NSTEMI, cardiogenic shock, septic shock, pneumonia, cellulitis, DKA, electrolyte abnormality, dehydration, acute on chronic renal failure, intracranial bleed, stroke, pulmonary embolism    Initial MDM/Plan: 60 y.o. male with past medical history of insulin dependent diabetes, hypertension, hyperlipidemia, chronic kidney disease with new onset renal failure, recently started on dialysis, coronary artery disease status post CABG in the early 90s and recent cardiac arrest 10/1/2022 with implanted LifeVest placed and recent cardiac catheterization at [facility] with stay at [facility] ICU, just discharged a few days ago who presents via [address] from scene for cardiac arrest.    Details of arrest are unclear at this time but we do note patient received 2 doses of epi, 1 dose of bicarb and 1 dose of calcium. ROSC was obtained and he was intubated in the field. He did require pressure support and transfer by [facility]. On arrival, patient is bradycardic with hypotension. Levophed was continued and required titration upwards. Patient does have all reflexes intact and was withdrawing to pain. Was biting on his ET tube and appeared somewhat uncomfortable. Fentanyl was started for sedation. Will consider adding Versed if he needs additional sedation. Plan for comprehensive work-up including cardiac labs and imaging as well as sepsis labs. Patient will warrant CT scan of the head, given questionable fall on aspirin and Plavix. We will also obtain CT scan of the chest to evaluate for pulmonary embolism. Patient will warrant admission to ICU. We will attempt to obtain patient's records from [facility].  At this time I am able to see that the patient had an encounter in their ICU but I am not able to see full details including labs, imaging, cath report or treatments administered.     DIAGNOSTIC RESULTS / EMERGENCYDEPARTMENT COURSE / MDM     LABS:  Results for orders placed or performed during the hospital encounter of 10/10/22   Blood Culture 1    Specimen: Blood   Result Value Ref Range    Specimen Description Jailene Sanches BLOOD     Special Requests LT AC 10ML     Culture NO GROWTH 12 HOURS    Culture, Urine    Specimen: Urine, clean catch   Result Value Ref Range    Specimen Description . CLEAN CATCH URINE     Culture DUPLICATE ORDER    Culture, Blood 1    Specimen: Blood   Result Value Ref Range    Specimen Description . BLOOD     Special Requests 1.5 RIGHT HAND     Culture NO GROWTH <24 HRS    CMP   Result Value Ref Range    Glucose 115 (H) 70 - 99 mg/dL    BUN 61 (H) 8 - 23 mg/dL    Creatinine 4.60 (H) 0.70 - 1.20 mg/dL    Est, Glom Filt Rate 14 (L) >60 mL/min/1.73m2    Calcium 8.5 (L) 8.6 - 10.4 mg/dL    Sodium 127 (L) 135 - 144 mmol/L    Potassium 5.8 (H) 3.7 - 5.3 mmol/L    Chloride 91 (L) 98 - 107 mmol/L    CO2 17 (L) 20 - 31 mmol/L    Anion Gap 19 (H) 9 - 17 mmol/L    Alkaline Phosphatase 204 (H) 40 - 129 U/L     (H) 5 - 41 U/L     (H) <40 U/L    Total Bilirubin 0.4 0.3 - 1.2 mg/dL    Total Protein 5.5 (L) 6.4 - 8.3 g/dL    Albumin 3.3 (L) 3.5 - 5.2 g/dL    Albumin/Globulin Ratio 1.5 1.0 - 2.5   CBC with Auto Differential   Result Value Ref Range    WBC 26.6 (H) 3.5 - 11.3 k/uL    RBC 3.07 (L) 4.21 - 5.77 m/uL    Hemoglobin 8.6 (L) 13.0 - 17.0 g/dL    Hematocrit 27.5 (L) 40.7 - 50.3 %    MCV 89.6 82.6 - 102.9 fL    MCH 28.0 25.2 - 33.5 pg    MCHC 31.3 28.4 - 34.8 g/dL    RDW 15.8 (H) 11.8 - 14.4 %    Platelets 095 114 - 391 k/uL    MPV 10.9 8.1 - 13.5 fL    NRBC Automated 0.1 (H) 0.0 per 100 WBC    Immature Granulocytes 10 (H) 0 %    Seg Neutrophils 78 (H) 36 - 66 %    Lymphocytes 5 (L) 24 - 44 %    Monocytes 6 1 - 7 %    Eosinophils % 1 1 - 4 %    Basophils 0 0 - 2 %    Absolute Immature Granulocyte 2.66 (H) 0.00 - 0.30 k/uL    Segs Absolute 20.74 (H) 1.8 - 7.7 k/uL    Absolute Lymph # 1.33 1.0 - 4.8 k/uL    Absolute Mono # 1.60 (H) 0.1 - 0.8 k/uL    Absolute Eos # 0.27 0.0 - 0.4 k/uL    Basophils Absolute 0.00 0.0 - 0.2 k/uL    Morphology ANISOCYTOSIS PRESENT     Morphology 1+ ACANTHOCYTES    Troponin   Result Value Ref Range    Troponin, High Sensitivity 7,513 (HH) 0 - 22 ng/L   Brain Natriuretic Peptide   Result Value Ref Range    Pro-BNP 82,715 (H) <300 pg/mL   Lipase   Result Value Ref Range    Lipase 31 13 - 60 U/L   Lactic Acid   Result Value Ref Range    Lactic Acid, Whole Blood 6.2 (H) 0.7 - 2.1 mmol/L   Protime-INR   Result Value Ref Range    Protime 11.9 9.1 - 12.3 sec    INR 1.2    APTT   Result Value Ref Range    PTT 24.0 20.5 - 30.5 sec   TSH   Result Value Ref Range    TSH 6.38 (H) 0.30 - 5.00 uIU/mL   Urinalysis with Microscopic   Result Value Ref Range    Color, UA Dark Yellow (A) Yellow    Turbidity UA Cloudy (A) Clear    Glucose, Ur NEGATIVE NEGATIVE    Bilirubin Urine NEGATIVE  Verified by ictotest. (A) NEGATIVE    Ketones, Urine TRACE (A) NEGATIVE    Specific Gravity, UA 1.022 1.005 - 1.030    Urine Hgb NEGATIVE NEGATIVE    pH, UA 5.0 5.0 - 8.0    Protein, UA 2+ (A) NEGATIVE    Urobilinogen, Urine Normal Normal    Nitrite, Urine NEGATIVE NEGATIVE    Leukocyte Esterase, Urine TRACE (A) NEGATIVE    WBC, UA 0 TO 2 0 - 5 /HPF    RBC, UA 2 TO 5 0 - 2 /HPF    Casts UA 10 TO 20 0 - 2 /LPF    Casts UA HYALINE 0 - 2 /LPF    Epithelial Cells UA 0 TO 2 0 - 5 /HPF   ELECTROLYTES PLUS   Result Value Ref Range    POC Sodium 127 (L) 138 - 146 mmol/L    POC Potassium 5.6 (H) 3.5 - 4.5 mmol/L    POC Chloride 97 (L) 98 - 107 mmol/L    POC TCO2 18 (L) 22 - 30 mmol/L    Anion Gap 13 7 - 16 mmol/L   Hemoglobin and hematocrit, blood   Result Value Ref Range    POC Hemoglobin 8.0 (L) 13.5 - 17.5 g/dL    POC Hematocrit 23 (L) 41 - 53 %   CALCIUM, IONIC (POC)   Result Value Ref Range    POC Ionized Calcium 1.11 (L) 1.15 - 1.33 mmol/L   Troponin   Result Value Ref Range    Troponin, High Sensitivity 6,838 (HH) 0 - 22 ng/L   ELECTROLYTES PLUS   Result Value Ref Range    POC Sodium 126 (L) 138 - 146 mmol/L    POC Potassium 5.0 (H) 3.5 - 4.5 mmol/L    POC Chloride 96 (L) 98 - 107 mmol/L    POC TCO2 19 (L) 22 - 30 mmol/L    Anion Gap 12 7 - 16 mmol/L   Hemoglobin and hematocrit, blood   Result Value Ref Range    POC Hemoglobin 8.3 (L) 13.5 - 17.5 g/dL    POC Hematocrit 24 (L) 41 - 53 %   CALCIUM, IONIC (POC)   Result Value Ref Range    POC Ionized Calcium 1.10 (L) 1.15 - 1.33 mmol/L   Arterial Blood Gas, POC   Result Value Ref Range    POC pH 7.327 (L) 7.350 - 7.450    POC pCO2 35.7 35.0 - 48.0 mm Hg    POC PO2 243.2 (H) 83.0 - 108.0 mm Hg    POC HCO3 18.7 (L) 21.0 - 28.0 mmol/L    Negative Base Excess, Art 7 (H) 0.0 - 2.0    POC O2  (H) 94.0 - 98.0 %    Sample Site Arterial Line     Mode PRVC     FIO2 100.0          RADIOLOGY:  CT HEAD WO CONTRAST    Result Date: 10/10/2022  EXAMINATION: CT OF THE HEAD WITHOUT CONTRAST  10/10/2022 6:34 am TECHNIQUE: CT of the head was performed without the administration of intravenous contrast. Automated exposure control, iterative reconstruction, and/or weight based adjustment of the mA/kV was utilized to reduce the radiation dose to as low as reasonably achievable. COMPARISON: None HISTORY: ORDERING SYSTEM PROVIDED HISTORY: cardiac arrest TECHNOLOGIST PROVIDED HISTORY: cardiac arrest Decision Support Exception - unselect if not a suspected or confirmed emergency medical condition->Emergency Medical Condition (MA) Reason for Exam: cardiac arrest FINDINGS: BRAIN/VENTRICLES: There is no acute intracranial hemorrhage, mass effect or midline shift. No abnormal extra-axial fluid collection. The gray-white differentiation is maintained without evidence of an acute infarct. There is no evidence of hydrocephalus. 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 There is an old lacune involving the right basal ganglia. Mild involutional changes are noted within the brain. ORBITS: The visualized portion of the orbits demonstrate no acute abnormality. SINUSES: The visualized paranasal sinuses and mastoid air cells demonstrate no acute abnormality.  SOFT TISSUES/SKULL:  No acute abnormality of the visualized skull or soft tissues. Old infarct involving the posterior right frontal lobe. Old right basal ganglionic lacune. No acute intracranial abnormality. XR CHEST PORTABLE    Result Date: 10/10/2022  EXAMINATION: ONE SUPINE XRAY VIEW(S) OF THE ABDOMEN; ONE XRAY VIEW OF THE CHEST 10/10/2022 10:23 am; 10/10/2022 9:16 am COMPARISON: Chest radiograph today HISTORY: ORDERING SYSTEM PROVIDED HISTORY: Quatro placement TECHNOLOGIST PROVIDED HISTORY: Carlos Gouty placement; ORDERING SYSTEM PROVIDED HISTORY: quattro placement TECHNOLOGIST PROVIDED HISTORY: quattro placement FINDINGS: Chest: Endotracheal tube terminates in appropriate position above the joie. The enteric tube courses off the field of view in the upper abdomen. Tunneled right internal jugular dialysis catheter in place. Bilateral interstitial and ground-glass opacities are again demonstrated, although less prominent in the interval.  Findings compatible with small left effusion. No evidence for pneumothorax. Abdomen: Catheters project over the midline pelvis. Left femoral line in place. Right femoral catheter extends cephalad off the field of view and terminates at the level of the right atrium on the chest radiograph. Nonobstructive bowel gas pattern. 1.  Endotracheal tube remains in appropriate position. 2.  Enteric tube terminates at the level the body of the stomach. 3.  Femoral lines in place, as described. 4.  Bilateral airspace disease and small left effusion again noted, although less prominent in the interval.     XR CHEST PORTABLE    Result Date: 10/10/2022  EXAMINATION: ONE XRAY VIEW OF THE CHEST 10/10/2022 3:43 am COMPARISON: None.  HISTORY: ORDERING SYSTEM PROVIDED HISTORY: arrest TECHNOLOGIST PROVIDED HISTORY: arrest Reason for Exam: supine,post cardiac arrest,et placement,pt has life vest on FINDINGS: The joie is difficult to identify, though the endotracheal tube lies likely approximately 7 cm above the joie just beyond the thoracic inlet. There is a tunneled dialysis catheter seen with the tips in the right atrium. Cardiac size at the upper limits of normal.  Diffuse infiltrates are seen throughout the lungs bilaterally. No pneumothorax is identified. Median sternotomy wires. Coronary stents are noted. Surgical clips are seen projecting over the left upper abdomen medially. Suspected minimally displaced right anterolateral 5th rib fracture. 1. Endotracheal tube measures approximately 6-7 cm above the expected location the joie, somewhat difficult to see given overlapping lines leads and median sternotomy wires. 2. Diffuse parenchymal infiltrates are seen in the perihilar regions and throughout the lungs bilaterally, which may represent diffuse pulmonary edema and/or multifocal pneumonia. 3. Postoperative changes from prior open heart surgery, with coronary stents noted. 4. Probable 5th anterolateral right rib fracture with minimal displacement. No pneumothorax. EKG  Sinus bradycardia, rate: 57  DC: 154  QRS: 128  QT/QTc: 68/453  ST depression V4-V6  T wave inversion lead I, aVL, V4-V6    Posterior EKG  Sinus bradycardia, rate: 58  DC: 160  QRS: 112  QT/QTc: 428/418  T wave inversion lead I, V3-V6  No ST elevation    All EKG's are interpreted by the Emergency Department Physician who either signs or Co-signs this chart in the absence of a cardiologist.      MEDICATIONS ORDERED:  Orders Placed This Encounter   Medications    fentaNYL 50 mcg/mL (SUBLIMAZE) 50 MCG/ML infusion     Eileen Bruce: cabinet override    fentaNYL 50 mcg/mL 50 mL infusion     Order Specific Question:   Titrate Infusion? Answer:   Yes     Order Specific Question:   Initial Infusion Dose: Answer:   48 mcg/hr     Order Specific Question:   Goal of Therapy is:      Answer:   RASS of -1 to 1     Order Specific Question:   Contact Provider if:     Answer:   Patient is receiving the maximum dose and is not achieving the goal of therapy norepinephrine (LEVOPHED) 16 mg in sodium chloride 0.9 % 250 mL infusion     Order Specific Question:   Titrate Infusion? Answer:   Yes     Order Specific Question:   Initial Infusion Dose: Answer:   5 mcg/min     Order Specific Question:   Goal of Therapy is:      Answer:   MAP greater than 65 mmHg     Order Specific Question:   Contact Provider if:     Answer:   Patient is receiving the maximum dose and is not achieving the goal of therapy    dextrose 10 % infusion     Alycia Hollis: cabinet override    sodium chloride flush 0.9 % injection 5-40 mL    sodium chloride flush 0.9 % injection 5-40 mL    0.9 % sodium chloride infusion    dextrose bolus (PED-JASMIN) 10% 250 mL    0.9 % sodium chloride bolus    cefepime (MAXIPIME) 2000 mg IVPB minibag     Order Specific Question:   Antimicrobial Indications     Answer:   Sepsis of Unknown Etiology    DISCONTD: vancomycin (VANCOCIN) 1000 mg in dextrose 5% 200 mL IVPB     Order Specific Question:   Antimicrobial Indications     Answer:   Sepsis of Unknown Etiology    calcium gluconate 2000 mg in sodium chloride 100 mL    DISCONTD: vancomycin (VANCOCIN) 1000 mg in sodium chloride 0.9% 250 mL IVPB     Order Specific Question:   Antimicrobial Indications     Answer:   Sepsis of Unknown Etiology     Order Specific Question:   Sepsis duration of therapy     Answer:   7 days    DISCONTD: sodium zirconium cyclosilicate (LOKELMA) oral suspension 10 g    ampicillin-sulbactam (UNASYN) 1500 mg in 50 mL NS IVPB minibag     Order Specific Question:   Antimicrobial Indications     Answer:   Sepsis of Unknown Etiology     Order Specific Question:   Sepsis duration of therapy     Answer:   7 days    heparin (porcine) injection 4,000 Units    heparin (porcine) injection 4,000 Units    heparin (porcine) injection 2,000 Units       PROCEDURES:  See separate procedure notes      CONSULTS:  IP CONSULT TO CRITICAL CARE  IP CONSULT TO CARDIOLOGY  IP CONSULT TO NEPHROLOGY  IP CONSULT TO PALLIATIVE CARE  IP CONSULT TO DIETITIAN      EMERGENCY DEPARTMENT COURSE:  6444: EKG sent to interventional cardiology. They state is not consistent with a STEMI and there is no immediate need for cath at this time. 0400: Patient arrived with fluid pump in place. Initially had difficulty removing this and he became hypoglycemic to the 40s. Gave patient D10 bolus followed by D10 infusion until insulin pump was able to be removed. Patient is also slightly hyperkalemic. Will not give insulin in addition to what is being given to him by his insulin pump until glucose has improved and we have a recheck on his potassium. 7030: Patient's chest x-ray shows that ET tube is at the level of the clavicles, however it is currently at 21 at the lip. Given that patient is satting at 100% and has bilateral breath sounds, will hold off on adjusting the ET tube at this time, as patient would likely need full RSI because I do not feel comfortable just advancing the tube with the depth that is already at. Discussed this with respiratory. If patient's respiratory status declines in any way, will readjust ET tube in the emergency department. 0430: Labs thus far remarkable for leukocytosis with WBC of 26.6. Lactic acid also 6.2. Patient appears both dry but also with evidence of fluid overload on his chest x-ray it is his lower extremities. We will give him 1 L of fluids but hold off on full 30 cc/kg IV fluid bolus, especially given his heart failure and low EF. Broad-spectrum antibiotics will be started. Patient does have acute kidney disease so will give cefepime and Vanco for better protection of kidneys. Patient is pending CT scan of the head as well as CT scan of the chest.  Nephrology consulted for clearance for CT scan with contrast.    0445: Discussed the patient with critical care who accepts him for admission.   Recommending cooling catheter be placed at this time, however will need to manage patient's critical issues first.  Will place if I have time. Central line and arterial line already placed. Please see separate procedure notes for details. 0500: Discussed the patient with nephrology who did not clear him for CT PE at this time, given his new requirement for dialysis. Patient's troponin have since resulted during this time and are greater than 7000. Plan to start patient on IV heparin anyways. We will hold off on CT scan of the chest at this time. We will still obtain CT scan of the head prior to administering heparin. Nephrology also [facility]. This was ordered. 0515: Updated patient's family on his critical condition and plan of care. 0530: Given elevated troponins, repeat EKG obtained. It is overall unchanged from previous. This was sent to cardiology who recommends treatment with heparin. If CT scan of the head is negative will start heparin. Cardiology recommended resending EKG to interventional cardiology which was done. 1897: Interventional cardiology has reviewed EKG and troponins and recommends activating the Cath Lab. Patient is currently in [place] [address] and will be sent to Cath Lab immediately after. 0630: Patient to cath lab       FINAL IMPRESSION      1. Cardiac arrest ([facility] [address]          DISPOSITION / PLAN     DISPOSITION Admitted 10/10/2022 04:42:50 AM      PATIENT REFERRED TO:  No follow-up provider specified.     DISCHARGE MEDICATIONS:  Current Discharge Medication List          Michael Bobby DO  Emergency Medicine Resident    (Please note that portions of this note were completed with a voice recognition program.Efforts were made to edit the dictations but occasionally words are mis-transcribed.)          Michael Bobby DO  Resident  10/11/22 0972

## 2022-10-11 NOTE — PROGRESS NOTES
[facility]    Consult / H&P               Today's Date: 10/11/2022  Patient Name: Rose Khoury Date of admission: 10/10/2022  3:21 AM  Patient's age: 60 y.o., 1960  Admission Dx: Cardiac arrest [facility]) [I46.9]    Reason for Consult:  Cardiac evaluation    Requesting Physician: Razia Hui MD    CHIEF COMPLAINT:  cardiac arrest    History Obtained From:  electronic medical record    HISTORY OF PRESENT ILLNESS:      The patient is a 60 y.o. M h/o ckd, dm, htn, hld, cad s/p cabg 1998 multiple stents occluded vein grafts-most recent laser atherectomy & brachy therapy at [facility] to L-circumflex and OM2 artery 2/2 recurrent in-stent thrombosis (4/6/21) & w/ recent cath 10/4/22 showing : \" 1. Known severe multivessel coronary artery disease. 2. Patent left internal mammary artery to left anterior descending   artery with collateral circulation to posterior descending artery which   appears to be supplying nonviable myocardium in the inferior distribution     3. Elevated left ventricular end-diastolic pressure,\" w/ estimated EF 25% presents as cardiac arrest-from [facility]-unknown down-time-has had cardiac arrest previously in last 10 days-previously seen at [facility]. Unknown arrest rhythm. On arrival hr 60, ekg w/ diffuse st-t changes-depressions/twi v4-v6, interventional consulted and suggested optimal medical management as recent cath 10/4/22 and patient HMD-stable. Was intubated for airway protection. Trops 7.5k initially.  Also various bmp abnormalities c/w h/o ckd and major insult including hyperkalemia 5.8, lactate 6.2, bicarb 17, etc. Probnp also 82k    Current evaluation:  -remains intubated/sedated on vent  -TTM to 33C  -MAP 66 on levo, vaso; hr 53  -also bicarb-gtt, fentanyl-gtt, heparin-gtt  -trops: 7.5k>6.8k>7.2k  -tsh high no free t4  -cr improving  -lactate 2.6 but downtrending  -wbc 26>33>29    Past Medical History:   has a past medical history of Chronic kidney disease, CKD (chronic kidney disease), Diabetes ([facility] [address] Hyperlipidemia, Hypertension, Ischemic heart disease, MRSA (methicillin resistant staph aureus) culture positive, Neuropathy, and Sleep apnea. Past Surgical History:   has a past surgical history that includes Appendectomy; eye surgery; Cardiac catheterization; and Finger trigger release. Home Medications:    Prior to Admission medications    Medication Sig Start Date End Date Taking?  Authorizing Provider   Cholecalciferol (VITAMIN D3) 2000 units CAPS Take by mouth every other day    Historical Provider, MD   sodium polystyrene (SPS) 15 GM/60ML suspension Take 60 mLs by mouth once for 1 dose 6/15/17 6/15/17  More Haque MD   aspirin 81 MG tablet Take 81 mg by mouth daily    Historical Provider, MD   ascorbic acid (VITAMIN C) 500 MG tablet Take 500 mg by mouth daily    Historical Provider, MD   ergocalciferol (DRISDOL) 48830 UNITS capsule Take 1 capsule by mouth once a week For 8 weeks, then 1 capsule by mouth once a month for 4 months 1/11/17   More Haque MD   Aspirin Buf,LlKmv-[id number]-VcLsi, (BUFFERED ASPIRIN) 325 MG TABS Take 325 mg by mouth daily    Historical Provider, MD   atorvastatin (LIPITOR) 40 MG tablet Take 40 mg by mouth daily    Historical Provider, MD   furosemide (LASIX) 40 MG tablet Take 40 mg by mouth daily    Historical Provider, MD   insulin lispro (HUMALOG) 100 UNIT/ML injection vial Inject into the skin Pt has a pump    Historical Provider, MD   hydrALAZINE (APRESOLINE) 50 MG tablet Take 50 mg by mouth Takes one tablet in am and two tablets in evening    Historical Provider, MD   isosorbide mononitrate (IMDUR) 60 MG CR tablet Take 60 mg by mouth daily    Historical Provider, MD   insulin glargine (LANTUS) 100 UNIT/ML injection vial Inject 20 Units into the skin nightly    Historical Provider, MD   lisinopril (PRINIVIL;ZESTRIL) 5 MG tablet Take 2.5 mg by mouth daily     Historical Provider, MD   Multiple Vitamins-Minerals (THERAPEUTIC 100 mL IVPB, 4,000 mg, IntraVENous, PRN  sodium phosphate 6 mmol in sodium chloride 0.9 % 250 mL IVPB, 6 mmol, IntraVENous, PRN **OR** sodium phosphate 12 mmol in sodium chloride 0.9 % 250 mL IVPB, 12 mmol, IntraVENous, PRN **OR** sodium phosphate 18 mmol in sodium chloride 0.9 % 500 mL IVPB, 18 mmol, IntraVENous, PRN **OR** sodium phosphate 24 mmol in sodium chloride 0.9 % 500 mL IVPB, 24 mmol, IntraVENous, PRN  prismaSATE BK 0/3.5 5,000 mL with potassium chloride 10 mEq solution, 1,500 mL/hr, Dialysis, Continuous  sodium bicarbonate 75 mEq in sodium chloride 0.45 % 1,000 mL infusion, , IntraVENous, Continuous  heparin 25,000 units in dextrose 5 % 250 mL infusion (rate based), 5-30 Units/kg/hr, IntraVENous, Continuous  ondansetron (ZOFRAN-ODT) disintegrating tablet 4 mg, 4 mg, Oral, Q8H PRN **OR** ondansetron (ZOFRAN) injection 4 mg, 4 mg, IntraVENous, Q6H PRN  chlorhexidine (PERIDEX) 0.12 % solution 15 mL, 15 mL, Mouth/Throat, BID  heparin (porcine) injection 1,700 Units, 1,700 Units, IntraCATHeter, PRN  heparin (porcine) injection 1,800 Units, 1,800 Units, IntraCATHeter, PRN    Allergies:  Patient has no known allergies. Social History:   reports that he has quit smoking. He has never used smokeless tobacco. He reports that he does not drink alcohol. Family History: family history includes Diabetes in his mother; Heart Disease in his father; Other in his mother; Stroke in his mother. No h/o sudden cardiac death. unknown for premature CAD    REVIEW OF SYSTEMS:    Unknown-patient intubated/sedated came in as arrest      PHYSICAL EXAM:      BP (!) 116/43   Pulse 53   Temp (!) 91.4 °F (33 °C) (Esophageal)   Resp 18   Ht 5' 2\" (1.575 m)   Wt 163 lb 9.3 oz (74.2 kg)   SpO2 100%   BMI 29.92 kg/m²    Constitutional and General Appearance: intubated/sedated  HEENT: PERRL, no cervical lymphadenopathy. No masses palpable.  Normal oral mucosa  Respiratory:  Intubated-ctab b/l anterior chest-mechanical breath sounds  Cardiovascular: The apical impulse is not displaced  Heart tones are crisp and normal. regular S1 and S2.  Jugular venous pulsation Normal  The carotid upstroke is normal in amplitude and contour without delay or bruit  Peripheral pulses are symmetrical and full   Abdomen:   No masses or tenderness  Bowel sounds present  Extremities:   No Cyanosis or Clubbing   Lower extremity edema: No   Skin: Warm and dry  Neurological:  sedated    DATA:    Diagnostics:    EKG: sinus greg hr 57, st-depression/twi v4-v6  ECHO: 25% per recent cath 10/4/22. Ejection fraction: 25%  Stress Test: not obtained. Cardiac Angiography: 10/4/22 at [facility]:  \" 1. Known severe multivessel coronary artery disease. 2. Patent left internal mammary artery to left anterior descending   artery with collateral circulation to posterior descending artery which   appears to be supplying nonviable myocardium in the inferior distribution     3. Elevated left ventricular end-diastolic pressure. Recommendations:   1. Optimize medical therapy for left ventricular dysfunction   2. Consider referral for AICD in 3 months. Coronary Findings Diagnostic Dominance: Left   Left Main: The vessel was visualized by angiography and is moderate in size. Previously placed Ost LM to Dist LM stent (unknown type) is widely patent. Left Anterior Descending: Ost LAD to Mid LAD lesion is 100% stenosed. Dist LAD lesion is 50% stenosed. Left Circumflex: Previously placed Ost Cx to Dist Cx stent (unknown type) is widely patent. First Obtuse Marginal Branch: 1st Mrg lesion is 100% stenosed. Second Obtuse Marginal Branch: Previously placed 2nd Mrg stent (unknown type) is widely patent. Third Obtuse Marginal Branch: 3rd Mrg filled by collaterals from Dist LAD. 3rd Mrg lesion is 100% stenosed. Left Posterior Descending Artery: LPDA filled by collaterals from Dist LAD. Left Posterior Atrioventricular Artery: LPAV lesion is 100% stenosed.    LIMA Graft To Dist LAD: The graft was visualized by angiography and is large. The graft is angiographically normal.     Intervention   No interventions have been documented. Left Ventricle   The left ventricular size is in the upper limits of normal. There is severe left ventricular systolic dysfunction. LV end diastolic pressure is elevated. The ejection fraction is calculated to be 25%. There are wall motion abnormalities in the left ventricle. There is no evidence of mitral regurgitation. The left ventricular end-diastolic pressure measured 20 mmHg. Aortic Valve   No gradient noted across aortic valve on left heart pullback     Wall Motion   The following segments are akinetic: mid inferior. The following segments are hypokinetic: posterior, mid intravascular septum and basilar posterior. \"    Labs:     CBC:   Recent Labs     10/10/22  0759 10/11/22  0341   WBC 33.1* 29.4*   HGB 8.2* 8.0*   HCT 25.3* 23.5*    334     BMP:   Recent Labs     10/10/22  2131 10/11/22  0341   * 131*   K 4.9 4.7   CO2 19* 20   BUN 50* 46*   CREATININE 3.11* 2.74*   LABGLOM 22* 25*   GLUCOSE 123* 130*     BNP: No results for input(s): BNP in the last 72 hours. PT/INR:   Recent Labs     10/10/22  0342 10/10/22  1644   PROTIME 11.9 13.6*   INR 1.2 1.4     APTT:  Recent Labs     10/11/22  0048 10/11/22  0616   APTT 53.6* 43.5*     CARDIAC ENZYMES:No results for input(s): CKTOTAL, CKMB, CKMBINDEX, TROPONINI in the last 72 hours.   FASTING LIPID PANEL:No results found for: HDL, LDLDIRECT, LDLCALC, TRIG  LIVER PROFILE:  Recent Labs     10/10/22  0342 10/11/22  0341   * 94*   * 119*   LABALBU 3.3* 2.9*       IMPRESSION:    Patient Active Problem List   Diagnosis    CKD (chronic kidney disease) stage 3, GFR 30-59 ml/min (HCC)    DM (diabetes mellitus) (HCC)    Hx of CABG    Benign essential HTN    Hyperparathyroidism ([address]    Cardiac arrest (HCC)    Hyperkalemia    Metabolic acidosis       -Recent hospitalization at [facility] for bradycardiac arrest. Cardiac cath-MV CAD LIMA-LAD patent. LM-LCX stent patent. OM1 occluded. OM2 patent stent. OM3 filled by collaterals from LAD. LVEF 25-30% and was discharged on Lifevest. -presented this hospitalization again w/ cardiac arrest. No report of shockable rhythm. -ESRD-dialysis dependent. -HTN  -DM  -HLD    RECOMMENDATIONS:  Continue trend trops-if continues to uptrend will consider cath although had recent cath 10/4/22 at outside facility showing patent stents  Continue heparin-gtt  Can continue TTM per protocol  Resume home meds via ogt once more stabilized e.g. [id number], statin  D/w GOC w/ family if available-patient has had repeat arrest in under 10 days severe CAD, poor EF and on high-dose pressors currently  Continue supportive treatment. Will needed dual chamber ICD prior to discharge pending clinical recovery    Further recommendations/finalized plan pending d/w attending on rounds. Nestora Angelucci, MD  PGY-3, Department of Internal Medicine  [address]    I performed a history and physical examination of the patient and discussed management with the resident. I reviewed the residents note and agree with the documented findings and plan of care. Any areas of disagreement are noted on the chart. I was personally present for the key portions of any procedures. I have documented in the chart those procedures where I was not present during the key portions. I have personally evaluated this patient and have completed at least one if not all key elements of the E/M (history, physical exam, and MDM). Additional findings are as noted. Intubated. On minimal fentanyl dose. Not paralyzed. On hypothermia protocol. Concern for anoxic injury. Obtain lifevest recordings.     Melisa Escalante MD

## 2022-10-11 NOTE — PROGRESS NOTES
Comprehensive Nutrition Assessment    Type and Reason for Visit:  Initial, Consult (TF ordering/management)    Nutrition Recommendations/Plan:   Start nutrition support as appropriate. If/when TF warranted, suggest Renal formula goal 40 mL/hr to provide 1728 kcal and 78 g pro/day. Will follow/monitor care plans. Malnutrition Assessment:  Malnutrition Status:  Insufficient data (10/11/22 1420)    Context:  Acute Illness       Nutrition Assessment:    Pt admitted s/p cardiac arrest. PMH includes: diabetes, hypertension, hyperlipidemia, chronic kidney disease, dialysis, coronary artery disease,CABG. Pt is currently intubated and on CRRT. No nutrition at present; RD consulted for TF ordering/management. RN to verify initiation of enteral feeds with CC team d/t current pressors. Will provide TF recommendations in chart. Meds/labs reviewed. Nutrition Related Findings:      Wound Type:  (traumatic wounds to bilat LE)       Current Nutrition Intake & Therapies:    Diet NPO  Additional Calorie Sources:  none    Anthropometric Measures:  Height: 5' 2\" (157.5 cm)  Ideal Body Weight (IBW): 118 lbs (54 kg)       Current Body Weight: 163 lb 9.3 oz (74.2 kg), 138.6 % IBW. Weight Source: Bed Scale  Current BMI (kg/m2): 29.9                          BMI Categories: Overweight (BMI 25.0-29. 9)    Estimated Daily Nutrient Needs:  Energy Requirements Based On: Kcal/kg  Weight Used for Energy Requirements: Current  Energy (kcal/day): 1600 kcal/day  Weight Used for Protein Requirements: Ideal  Protein (g/day):  g pro/day  Method Used for Fluid Requirements: Other (Comment)  Fluid (ml/day): per MD    Nutrition Diagnosis:   Inadequate oral intake related to impaired respiratory function as evidenced by NPO or clear liquid status due to medical condition    Nutrition Interventions:   Food and/or Nutrient Delivery: Start nutrition support as appropriate.  If TF warranted, suggest Renal formula goal 40 mL/hr to provide 1728 kcal and 78 g pro/day. Nutrition Education/Counseling: No recommendation at this time  Coordination of Nutrition Care: Continue to monitor while inpatient  Plan of Care discussed with: RN    Goals:     Goals: Meet at least 75% of estimated needs, within 7 days       Nutrition Monitoring and Evaluation:   Behavioral-Environmental Outcomes: None Identified  Food/Nutrient Intake Outcomes: Diet Advancement/Tolerance, Enteral Nutrition Intake/Tolerance  Physical Signs/Symptoms Outcomes: Biochemical Data, Nutrition Focused Physical Findings, Skin, Weight, Hemodynamic Status, Fluid Status or Edema    Discharge Planning:     Too soon to determine     [address], LD  Contact: [phone]

## 2022-10-11 NOTE — PROCEDURES
PROCEDURE NOTE - CENTRAL VENOUS LINE PLACEMENT    PATIENT NAME: Jesse GARVEY [address] [place] [address] RECORD NO. [id number]  DATE: 10/10/2022  ATTENDING PHYSICIAN: Dr. Lesly Blackwood DIAGNOSIS:  vascular access, poor peripheral access, hypovolemia, long term access, and centrally administered medications  POSTOPERATIVE DIAGNOSIS:  Same  PROCEDURE PERFORMED:  Left Femoral Vein Central Line Insertion  PERFORMING PHYSICIAN: Kaushal Medina DO  ANESTHESIA:  Local utilizing 1% lidocaine  ESTIMATED BLOOD LOSS:  Less than 25 ml  COMPLICATIONS:  None immediately appreciated. DISCUSSION:  Nadja Brewer Sr. is a 60y.o.-year-old male who requires central IV access vascular access, poor peripheral access, hypovolemia, long term access, and central venous monitoring. The history and physical examination were reviewed and confirmed. CONSENT: Unable to be obtained due to patient's condition. PROCEDURE:  A timeout was initiated by the bedside nurse and was confirmed by those present. The patient was placed in a supine position. The skin overlying the Left Femoral Vein was prepped with chlorhexadine and draped in sterile fashion. The skin was infiltrated with local anesthetic. The vessel and surrounding anatomy was visualized using ultrasound. Through the anesthetized region, the introducer needle was inserted into the femoral vein returning dark red non pulsatile blood. A guidewire was placed through the center of the needle with no resistance. Ultrasound confirmed presence of wire in the vein. A small incision made in the skin with a #11 scalpel blade. The dilator was inserted into the skin and vein over guidewire using Seldinger technique. The dilator was then removed and the 7F 20 cm catheter was placed in the vein over the guidewire using Seldinger technique. The guidewire was then removed and all ports aspirated and flushed appropriately.  The catheter then secured using silk suture and a temporary sterile dressing was applied. No immediate complication was evident. All sponge, instrument and needle counts were correct at the completion of the procedure. The patient tolerated the procedure well with no immediate complication evident.      Michael Bobby DO  1:00 AM, 10/10/22

## 2022-10-11 NOTE — ACP (ADVANCE CARE PLANNING)
Advance Care Planning     Advance Care Planning (ACP) Physician/NP/PA (Provider) Conversation      Date of ACP Conversation: 10/10/2022    Conversation Conducted with:   Patient currently does not have decision-making [facility]:      Patient's legal spokesperson is his wife Tom Berry ([phone]) as per [facility] laws      Care Preferences:    Hospitalization: \"If your health worsens and it becomes clear that your chance of recovery is unlikely, what would your preference be regarding hospitalization? \"  Currently hospitalized    Ventilation: \"If you were in your present state of health and suddenly became very ill and were unable to breathe on your own, what would your preference be about the use of a ventilator (breathing machine) if it was available to you? \"    DNR CCA with intubation and treatments to continue    \"If your health worsens and it becomes clear that your chance of recovery is unlikely, what would your preference be about the use of a ventilator (breathing machine) if it was available to you? \"   DNR CCA with intubation and treatments to continue    Resuscitation:  \"CPR works best to restart the heart when there is a sudden event, like a heart attack, in someone who is otherwise healthy. Unfortunately, CPR does not typically restart the heart for people who have serious health conditions or who are very sick. \"    \"In the event your heart stopped as a result of an underlying serious health condition, would you want attempts to be made to restart your heart (answer \"yes\" for attempt to resuscitate) or would you prefer a natural death (answer \"no\" for do not attempt to resuscitate)? \"     DNR CCA with intubation and treatments to continue    Conversation Outcomes / Follow-Up Plan:     Patient's legal spokesperson is his wife Tom Berry ([phone]) as per [employer] [facility] laws    DNR CCA with intubation and treatments to continue      Length of Voluntary ACP Conversation in minutes:  16 minutes    Antoinette Correa MD

## 2022-10-11 NOTE — PLAN OF CARE
Problem: Discharge Planning  Goal: Discharge to home or other facility with appropriate resources  10/11/2022 0937 by Angel Hernandez RN  Outcome: Progressing  Flowsheets (Taken 10/11/2022 0800)  Discharge to home or other facility with appropriate resources: Identify barriers to discharge with patient and caregiver  10/10/2022 2054 by Ruchi Mahajan RN  Outcome: Progressing     Problem: Respiratory - Adult  Goal: Achieves optimal ventilation and oxygenation  10/11/2022 0937 by Angel Hernandez RN  Outcome: Progressing  10/11/2022 0830 by Svetlana Panchal RCP  Outcome: Progressing  10/10/2022 2054 by Ruchi Mahajan RN  Outcome: Progressing     Problem: Chronic Conditions and Co-morbidities  Goal: Patient's chronic conditions and co-morbidity symptoms are monitored and maintained or improved  10/11/2022 0937 by Angel Hernandez RN  Outcome: Progressing  Flowsheets (Taken 10/11/2022 0800)  Care Plan - Patient's Chronic Conditions and Co-Morbidity Symptoms are Monitored and Maintained or Improved: Monitor and assess patient's chronic conditions and comorbid symptoms for stability, deterioration, or improvement  10/10/2022 2054 by Ruchi Mahajan RN  Outcome: Progressing     Problem: Pain  Goal: Verbalizes/displays adequate comfort level or baseline comfort level  10/11/2022 0937 by Angel Hernandez RN  Outcome: Progressing  Flowsheets (Taken 10/11/2022 0800)  Verbalizes/displays adequate comfort level or baseline comfort level: Assess pain using appropriate pain scale  10/10/2022 2054 by Ruchi Mahaajn RN  Outcome: Progressing  Flowsheets  Taken 10/10/2022 1600 by Angel Hernandez RN  Verbalizes/displays adequate comfort level or baseline comfort level: Assess pain using appropriate pain scale  Taken 10/10/2022 1200 by Angel Hernandez RN  Verbalizes/displays adequate comfort level or baseline comfort level:   Assess pain using appropriate pain scale   Encourage patient to monitor pain and request assistance Problem: Safety - Adult  Goal: Free from fall injury  10/11/2022 0937 by Radha Reyes RN  Outcome: Progressing  10/10/2022 2054 by Jackson Slade RN  Outcome: Progressing     Problem: ABCDS Injury Assessment  Goal: Absence of physical injury  10/11/2022 0937 by Radha Reyes RN  Outcome: Progressing  10/10/2022 2054 by Jackson Slade RN  Outcome: Progressing     Problem: Skin/Tissue Integrity  Goal: Absence of new skin breakdown  Description: 1. Monitor for areas of redness and/or skin breakdown  2. Assess vascular access sites hourly  3. Every 4-6 hours minimum:  Change oxygen saturation probe site  4. Every 4-6 hours:  If on nasal continuous positive airway pressure, respiratory therapy assess nares and determine need for appliance change or resting period. 10/11/2022 0296 by Radha Reyes RN  Outcome: Progressing  10/10/2022 2054 by Jackson Slade RN  Outcome: Progressing     Problem: Safety - Medical Restraint  Goal: Remains free of injury from restraints (Restraint for Interference with Medical Device)  Description: INTERVENTIONS:  1. Determine that other, less restrictive measures have been tried or would not be effective before applying the restraint  2. Evaluate the patient's condition at the time of restraint application  3. Inform patient/family regarding the reason for restraint  4.  Q2H: Monitor safety, psychosocial status, comfort, nutrition and hydration  10/11/2022 0937 by Radha Reyes RN  Outcome: Progressing  Flowsheets (Taken 10/11/2022 0800)  Remains free of injury from restraints (restraint for interference with medical device): Every 2 hours: Monitor safety, psychosocial status, comfort, nutrition and hydration  10/10/2022 2054 by Jackson Slade RN  Outcome: Progressing  Flowsheets  Taken 10/10/2022 1800 by Radha Reyes RN  Remains free of injury from restraints (restraint for interference with medical device): Every 2 hours: Monitor safety, psychosocial status, comfort, nutrition and hydration  Taken 10/10/2022 1600 by Maye Coburn RN  Remains free of injury from restraints (restraint for interference with medical device): Every 2 hours: Monitor safety, psychosocial status, comfort, nutrition and hydration  Taken 10/10/2022 1400 by Maye Coburn RN  Remains free of injury from restraints (restraint for interference with medical device): Every 2 hours: Monitor safety, psychosocial status, comfort, nutrition and hydration  Taken 10/10/2022 1238 by Maye Coburn RN  Remains free of injury from restraints (restraint for interference with medical device): Every 2 hours: Monitor safety, psychosocial status, comfort, nutrition and hydration     Problem: Confusion  Goal: Confusion, delirium, dementia, or psychosis is improved or at baseline  Description: INTERVENTIONS:  1. Assess for possible contributors to thought disturbance, including medications, impaired vision or hearing, underlying metabolic abnormalities, dehydration, psychiatric diagnoses, and notify attending LIP  2. [place] high risk fall precautions, as indicated  3. Provide frequent short contacts to provide reality reorientation, refocusing and direction  4. Decrease environmental stimuli, including noise as appropriate  5. Monitor and intervene to maintain adequate nutrition, hydration, elimination, sleep and activity  6. If unable to ensure safety without constant attention obtain sitter and review sitter guidelines with assigned personnel  7.  Initiate Psychosocial CNS and Spiritual Care consult, as indicated  10/11/2022 2200 by Maye Coburn RN  Outcome: Progressing  10/10/2022 2054 by Wesley Salgado RN  Outcome: Progressing

## 2022-10-12 PROBLEM — N18.6 END STAGE RENAL DISEASE (HCC): Status: ACTIVE | Noted: 2022-01-01

## 2022-10-12 NOTE — FLOWSHEET NOTE
Spoke with critical care resident regarding pts condition. Family wishes to make the pt comfortable after the grandson gets here. He gets out of work at 6 pm tonight. They want to withdraw support and make him comfortable. Both sons at bedside to support pts wife. Order given for IVP Sodium bicarb to attempt to keep pt alive until grandson arrives.

## 2022-10-12 NOTE — PROGRESS NOTES
Nephrology Progress Note        Subjective: Patient was seen and examined and chart reviewed. CVVHD orders were reviewed with dialysis nurses  Patient is still on pressors, receiving Levophed. His blood pressure on the lower side of normal.  CRRT setting is even. Patient is on hypothermia protocol and tolerating well. He is oliguric and not making much urine  Most recent arterial blood gas shows pH of 7.38, PCO2 of 18, PO2 of 136. I called [place] dialysis unit. Patient does not have established care at [facility] and they are not aware where he is receiving his dialysis. From neuro critical care reviewed, plan for MRI within the next 72 hours was noted      Objective:  CURRENT TEMPERATURE:  Temp: (!) 91.6 °F (33.1 °C)  MAXIMUM TEMPERATURE OVER 24HRS:  Temp (24hrs), Av.6 °F (33.1 °C), Min:91.6 °F (33.1 °C), Max:91.6 °F (33.1 °C)    CURRENT RESPIRATORY RATE:  Resp: 18  CURRENT PULSE:  Heart Rate: 95  CURRENT BLOOD PRESSURE:  BP: (!) 107/40  24HR BLOOD PRESSURE RANGE:  Systolic (53BAJ), UYT:002 , Min:107 , SPT:006   ; Diastolic (59XNW), PPJ:81, Min:40, Max:50    24HR INTAKE/OUTPUT:    Intake/Output Summary (Last 24 hours) at 10/12/2022 0911  Last data filed at 10/12/2022 0906  Gross per 24 hour   Intake 4413.81 ml   Output 3584 ml   Net 829.81 ml     Patient Vitals for the past 96 hrs (Last 3 readings):   Weight   10/11/22 0117 163 lb 9.3 oz (74.2 kg)   10/10/22 1045 166 lb 3.6 oz (75.4 kg)   10/10/22 0315 165 lb (74.8 kg)         Physical Exam:  General appearance: Intubated and sedated. Patient is on CVVHD. Patient was orally intubated  Patient was not showing much response to physical stimuli  Skin: Warm to touch and no erythema  Eyes: Conjunctiva was pale   ENT: Patient was orally intubated  Neck: ABD was hard to assess, no accessory muscle use   pulmonary: Bilateral air entry.   cardiovascular: S1 and S2 audible no S3   abdomen: Abdomen was soft and nontender  Extremities trace to 1+ edema  Access:  previous permacath    Current Medications:    prismaSATE BK 0/3.5 5,000 mL with potassium chloride 5 mEq solution, Continuous  pantoprazole (PROTONIX) 40 mg in sodium chloride (PF) 10 mL injection, Q12H  midazolam (VERSED) 1 mg/mL in D5W infusion, Continuous  fentaNYL 50 mcg/mL 50 mL infusion, Continuous  norepinephrine (LEVOPHED) 16 mg in sodium chloride 0.9 % 250 mL infusion, Continuous  sodium chloride flush 0.9 % injection 5-40 mL, 2 times per day  sodium chloride flush 0.9 % injection 5-40 mL, PRN  0.9 % sodium chloride infusion, PRN  polyethylene glycol (GLYCOLAX) packet 17 g, Daily PRN  acetaminophen (TYLENOL) tablet 650 mg, Q6H PRN   Or  acetaminophen (TYLENOL) suppository 650 mg, Q6H PRN  ampicillin-sulbactam (UNASYN) 1500 mg in 50 mL NS IVPB minibag, Q6H  heparin (porcine) injection 4,000 Units, PRN  heparin (porcine) injection 2,000 Units, PRN  vasopressin 20 Units in dextrose 5 % 100 mL infusion, Continuous  potassium chloride 20 mEq/50 mL IVPB (Central Line), PRN  magnesium sulfate 1000 mg in dextrose 5% 100 mL IVPB, PRN  calcium gluconate 1000 mg in sodium chloride 50 mL, PRN   Or  calcium gluconate 2000 mg in sodium chloride 100 mL, PRN   Or  calcium gluconate 3,000 mg in dextrose 5 % 100 mL IVPB, PRN   Or  calcium gluconate 4,000 mg in dextrose 5 % 100 mL IVPB, PRN  sodium phosphate 6 mmol in sodium chloride 0.9 % 250 mL IVPB, PRN   Or  sodium phosphate 12 mmol in sodium chloride 0.9 % 250 mL IVPB, PRN   Or  sodium phosphate 18 mmol in sodium chloride 0.9 % 500 mL IVPB, PRN   Or  sodium phosphate 24 mmol in sodium chloride 0.9 % 500 mL IVPB, PRN  sodium bicarbonate 75 mEq in sodium chloride 0.45 % 1,000 mL infusion, Continuous  heparin 25,000 units in dextrose 5 % 250 mL infusion (rate based), Continuous  ondansetron (ZOFRAN-ODT) disintegrating tablet 4 mg, Q8H PRN   Or  ondansetron (ZOFRAN) injection 4 mg, Q6H PRN  chlorhexidine (PERIDEX) 0.12 % solution 15 mL, BID  heparin (porcine) injection 1,700 Units, PRN  heparin (porcine) injection 1,800 Units, PRN    Labs:   CBC:   Recent Labs     10/10/22  0759 10/11/22  0341 10/12/22  0351   WBC 33.1* 29.4* 37.9*   RBC 2.91* 2.74* 2.85*   HGB 8.2* 8.0* 8.0*   HCT 25.3* 23.5* 23.9*    334 436   MPV 10.7 10.6 10.9      BMP:   Recent Labs     10/11/22  1530 10/11/22  1800 10/11/22  2040 10/11/22  2221 10/12/22  0222 10/12/22  0351 10/12/22  0617     --  134*  --   --  132*  --    K 4.6   < > 4.1   < > 4.2 4.1 4.3   CL 96*  --  97*  --   --  93*  --    CO2 17*  --  17*  --   --  17*  --    BUN 39*  --  37*  --   --  36*  --    CREATININE 2.25*  --  2.01*  --   --  1.87*  --    GLUCOSE 155*  --  165*  --   --  199*  --    CALCIUM 8.5*  --  8.4*  --   --  8.4*  --     < > = values in this interval not displayed. Phosphorus:    Recent Labs     10/11/22  1530 10/11/22  2040 10/12/22  0351   PHOS 5.2* 4.8* 4.8*     Magnesium:   Recent Labs     10/11/22  1530 10/11/22  2040 10/12/22  0351   MG 1.9 1.8 1.9     Albumin:   Recent Labs     10/10/22  0342 10/11/22  0341 10/12/22  0351   LABALBU 3.3* 2.9* 3.0*       Dialysis bath:      Radiology:  Reviewed as available. Assessment:  1 advanced renal failure and newly diagnosed ESRD. Patient was presented with cardiac arrest.  Currently on hypothermia protocol. Patient was placed on CVVHD due to hemodynamic instability. Point his setting is 0 net removal.  Electrolytes has been checked as per CVVHD protocol. Patient is on pressors and hemodynamically stable. 2.cardiogenic shock, vasopressor dependent  3 status post out-of-hospital cardiac arrest, on hypothermia protocol  4 metabolic acidosis, improving with CVVHD and bicarbonate infusion 5. ANEMIA OF CHRONIC DISEASE:   6.i mild hyponatremia most likely dilutional and improving   7. Ventilator dependent acute respiratory failure      Plan:  1. Continue to run CVVHD and even fluid balance  2. Electrolyte replacement as per CVVHD protocol  3. Crease bicarbonate drip to 50 mL/h  4. ABG in a.m. I have spoken directly with the patient's ICU nurse at the bedside regarding the nephrology plan of care. Please do not hesitate to call with questions.     Electronically signed by Jerry Kowalski MD on 10/12/2022 at 9:11 AM

## 2022-10-12 NOTE — CARE COORDINATION
Transitional planning.  Intubated/ Sedated  FiO2 40%, PEEP of 8, CRRT, LTME, Hypothermia protocol, MRI 10/13

## 2022-10-12 NOTE — PROCEDURES
Referring physician: Betina PARRY  Date: 10/12/2022  Start Time:10/11/2022 @ [phone]  End Time: 10/12/2022 @ 0800    Indication  Patient with encephalopathy, EEG done to rule out subclinical seizures. Introduction  This continuous video-EEG was acquired using a [employer] workstation at 256 samples/s. Electrodes were placed according to the International 10-20 system. Automated spike and seizure detection algorithms were applied. Video was recorded during this study. Description  EEG was moderately limited due to EMG artifact, embedded there was suspicion for generalized periodic discharges, after paralytics given there was ~ 1 Hz generalized periodic discharges and at time seem more on the right. No consistent focal slowing or interhemispheric asymmetry was noted. Normal sleep structures were not observed. Events  Patient had frequent jaw clenching movement, it was not clear if correlating with generlaied periodic dicharges or not as EEG was limited with EMG artifact. Impression  Abnormal continuous vEEG recording, the slowing mentioned above suggests severe non specific encephalopathy. Jaw movements of unclear significance and need clinical correlation. Concerns for myoclonic epileptic activity. Generalized periodic discharges and lateralized periodic discharges can be seen on anoxic brain injury, epileptic, sedation vs others. Discussed with managing team.     Moncho Klein MD  Epilepsy Board Certified. Neurology Board Certified.     Electronically Signed

## 2022-10-12 NOTE — PROGRESS NOTES
[facility]   Progress Note                 Patient's name:  Arabella Smith Record Number: [id number]  Patient's account/billing number: [de-identified]  Patient's YOB: 1960  Age: 60 y.o. Date of Admission: 10/10/2022  3:21 AM  Date of History and Physical Examination: 10/12/2022  Primary Care Physician: Anirudh Nuñez    Code Status: DNR-CCA    CHIEF COMPLAINT:    Chief Complaint   Patient presents with    Cardiac Arrest     Arrives per LF4 from scene. Per LF, pt was cardiac arrest per EMS. Pt with ROSC and transported to [facility] to be transported to [facility]. Pt received 2 rounds of epi, unknown amounts of fentanyl and versed. Pt received 1 amp Bicarb and 1 amp calcium chloride. Pt arrives intubated, no sedation. ADMISSION DIAGNOSIS:   Cardiac arrest ([facility] [address]    REASON FOR CONSULT :   -cardiac arrest    SUBJECTIVE:      Current evaluation:  -remains intubated/sedated on vent  -MAP 50s on levo/vaso-55/0.04 respectively  -heparin-gtt  -fentanyl 100, versed-3 in AM  -TTM now 35.5C  -remains minimally responsive, oliguric  -labs reviewed: k 4, cr 1.87 (2.01), mg 1.9, wbc 29>37.9, bg 199, trops 2.7k>2.06k downtrending     Past Medical History:   has a past medical history of Chronic kidney disease, CKD (chronic kidney disease), Diabetes ([facility] [address] Hyperlipidemia, Hypertension, Ischemic heart disease, MRSA (methicillin resistant staph aureus) culture positive, Neuropathy, and Sleep apnea. Past Surgical History:   has a past surgical history that includes Appendectomy; eye surgery; Cardiac catheterization; and Finger trigger release. Home Medications:    Prior to Admission medications    Medication Sig Start Date End Date Taking?  Authorizing Provider   Cholecalciferol (VITAMIN D3) 2000 units CAPS Take by mouth every other day    Historical Provider, MD   sodium polystyrene (SPS) 15 GM/60ML suspension Take 60 mLs by mouth once for 1 dose 6/15/17 6/15/17  Mario Whalen MD aspirin 81 MG tablet Take 81 mg by mouth daily    Historical Provider, MD   ascorbic acid (VITAMIN C) 500 MG tablet Take 500 mg by mouth daily    Historical Provider, MD   ergocalciferol (DRISDOL) 60837 UNITS capsule Take 1 capsule by mouth once a week For 8 weeks, then 1 capsule by mouth once a month for 4 months 1/11/17   Huma Smith MD   Aspirin Buf,DtKrh-HxRdj-TmVxn, (BUFFERED ASPIRIN) 325 MG TABS Take 325 mg by mouth daily    Historical Provider, MD   atorvastatin (LIPITOR) 40 MG tablet Take 40 mg by mouth daily    Historical Provider, MD   furosemide (LASIX) 40 MG tablet Take 40 mg by mouth daily    Historical Provider, MD   insulin lispro (HUMALOG) 100 UNIT/ML injection vial Inject into the skin Pt has a pump    Historical Provider, MD   hydrALAZINE (APRESOLINE) 50 MG tablet Take 50 mg by mouth Takes one tablet in am and two tablets in evening    Historical Provider, MD   isosorbide mononitrate (IMDUR) 60 MG CR tablet Take 60 mg by mouth daily    Historical Provider, MD   insulin glargine (LANTUS) 100 UNIT/ML injection vial Inject 20 Units into the skin nightly    Historical Provider, MD   lisinopril (PRINIVIL;ZESTRIL) 5 MG tablet Take 2.5 mg by mouth daily     Historical Provider, MD   Multiple Vitamins-Minerals (THERAPEUTIC MULTIVITAMIN-MINERALS) tablet Take 1 tablet by mouth daily    Historical Provider, MD   nitroGLYCERIN (NITROSTAT) 0.4 MG SL tablet Place 0.4 mg under the tongue every 5 minutes as needed for Chest pain    Historical Provider, MD   clopidogrel (PLAVIX) 75 MG tablet Take 75 mg by mouth daily    Historical Provider, MD      Current Facility-Administered Medications: prismaSATE BK 0/3.5 5,000 mL with potassium chloride 5 mEq solution, 1,500 mL/hr, Dialysis, Continuous  pantoprazole (PROTONIX) 40 mg in sodium chloride (PF) 10 mL injection, 40 mg, IntraVENous, Q12H  midazolam (VERSED) 1 mg/mL in D5W infusion, 6 mg/hr, IntraVENous, Continuous  fentaNYL 50 mcg/mL 50 mL infusion,  mcg/hr, IntraVENous, Continuous  norepinephrine (LEVOPHED) 16 mg in sodium chloride 0.9 % 250 mL infusion, 1-100 mcg/min, IntraVENous, Continuous  sodium chloride flush 0.9 % injection 5-40 mL, 5-40 mL, IntraVENous, 2 times per day  sodium chloride flush 0.9 % injection 5-40 mL, 5-40 mL, IntraVENous, PRN  0.9 % sodium chloride infusion, , IntraVENous, PRN  polyethylene glycol (GLYCOLAX) packet 17 g, 17 g, Oral, Daily PRN  acetaminophen (TYLENOL) tablet 650 mg, 650 mg, Oral, Q6H PRN **OR** acetaminophen (TYLENOL) suppository 650 mg, 650 mg, Rectal, Q6H PRN  ampicillin-sulbactam (UNASYN) 1500 mg in 50 mL NS IVPB minibag, 1,500 mg, IntraVENous, Q6H  heparin (porcine) injection 4,000 Units, 4,000 Units, IntraVENous, PRN  heparin (porcine) injection 2,000 Units, 2,000 Units, IntraVENous, PRN  vasopressin 20 Units in dextrose 5 % 100 mL infusion, 0.04 Units/min, IntraVENous, Continuous  potassium chloride 20 mEq/50 mL IVPB (Central Line), 20 mEq, IntraVENous, PRN  magnesium sulfate 1000 mg in dextrose 5% 100 mL IVPB, 1,000 mg, IntraVENous, PRN  calcium gluconate 1000 mg in sodium chloride 50 mL, 1,000 mg, IntraVENous, PRN **OR** calcium gluconate 2000 mg in sodium chloride 100 mL, 2,000 mg, IntraVENous, PRN **OR** calcium gluconate 3,000 mg in dextrose 5 % 100 mL IVPB, 3,000 mg, IntraVENous, PRN **OR** calcium gluconate 4,000 mg in dextrose 5 % 100 mL IVPB, 4,000 mg, IntraVENous, PRN  sodium phosphate 6 mmol in sodium chloride 0.9 % 250 mL IVPB, 6 mmol, IntraVENous, PRN **OR** sodium phosphate 12 mmol in sodium chloride 0.9 % 250 mL IVPB, 12 mmol, IntraVENous, PRN **OR** sodium phosphate 18 mmol in sodium chloride 0.9 % 500 mL IVPB, 18 mmol, IntraVENous, PRN **OR** sodium phosphate 24 mmol in sodium chloride 0.9 % 500 mL IVPB, 24 mmol, IntraVENous, PRN  sodium bicarbonate 75 mEq in sodium chloride 0.45 % 1,000 mL infusion, , IntraVENous, Continuous  heparin 25,000 units in dextrose 5 % 250 mL infusion (rate based), 5-30 Units/kg/hr, IntraVENous, Continuous  ondansetron (ZOFRAN-ODT) disintegrating tablet 4 mg, 4 mg, Oral, Q8H PRN **OR** ondansetron (ZOFRAN) injection 4 mg, 4 mg, IntraVENous, Q6H PRN  chlorhexidine (PERIDEX) 0.12 % solution 15 mL, 15 mL, Mouth/Throat, BID  heparin (porcine) injection 1,700 Units, 1,700 Units, IntraCATHeter, PRN  heparin (porcine) injection 1,800 Units, 1,800 Units, IntraCATHeter, PRN    Allergies:  Patient has no known allergies. Social History:   reports that he has quit smoking. He has never used smokeless tobacco. He reports that he does not drink alcohol. Family History: family history includes Diabetes in his mother; Heart Disease in his father; Other in his mother; Stroke in his mother. No h/o sudden cardiac death. unknown for premature CAD    REVIEW OF SYSTEMS:    -patient intubated/sedated       PHYSICAL EXAM:      BP (!) 107/40   Pulse 99   Temp (!) 91.6 °F (33.1 °C) (Esophageal)   Resp 19   Ht 5' 2\" (1.575 m)   Wt 163 lb 9.3 oz (74.2 kg)   SpO2 98%   BMI 29.92 kg/m²    Constitutional and General Appearance: intubated/sedated  HEENT: PERRL, no cervical lymphadenopathy. No masses palpable. Normal oral mucosa  Respiratory:  Intubated-ctab b/l anterior chest-mechanical breath sounds  Cardiovascular: The apical impulse is not displaced  Heart tones are crisp and normal. regular S1 and S2.  Jugular venous pulsation Normal  The carotid upstroke is normal in amplitude and contour without delay or bruit  Peripheral pulses are symmetrical and full   Abdomen:   No masses or tenderness  Bowel sounds present  Extremities:   No Cyanosis or Clubbing   Lower extremity edema: No   Skin: Warm and dry  Neurological:  Sedated-minimally responsive on/off sedation    DATA:    Diagnostics:    EKG: sinus greg hr 57, st-depression/twi v4-v6  ECHO: 25% per recent cath 10/4/22. Ejection fraction: 25%  Stress Test: not obtained. Cardiac Angiography: 10/4/22 at [facility]:  \" 1.  Known severe multivessel coronary artery disease. 2. Patent left internal mammary artery to left anterior descending   artery with collateral circulation to posterior descending artery which   appears to be supplying nonviable myocardium in the inferior distribution     3. Elevated left ventricular end-diastolic pressure. Recommendations:   1. Optimize medical therapy for left ventricular dysfunction   2. Consider referral for AICD in 3 months. Coronary Findings Diagnostic Dominance: Left   Left Main: The vessel was visualized by angiography and is moderate in size. Previously placed Ost LM to Dist LM stent (unknown type) is widely patent. Left Anterior Descending: Ost LAD to Mid LAD lesion is 100% stenosed. Dist LAD lesion is 50% stenosed. Left Circumflex: Previously placed Ost Cx to Dist Cx stent (unknown type) is widely patent. First Obtuse Marginal Branch: 1st Mrg lesion is 100% stenosed. Second Obtuse Marginal Branch: Previously placed 2nd Mrg stent (unknown type) is widely patent. Third Obtuse Marginal Branch: 3rd Mrg filled by collaterals from Dist LAD. 3rd Mrg lesion is 100% stenosed. Left Posterior Descending Artery: LPDA filled by collaterals from Dist LAD. Left Posterior Atrioventricular Artery: LPAV lesion is 100% stenosed. LIMA Graft To Dist LAD: The graft was visualized by angiography and is large. The graft is angiographically normal.     Intervention   No interventions have been documented. Left Ventricle   The left ventricular size is in the upper limits of normal. There is severe left ventricular systolic dysfunction. LV end diastolic pressure is elevated. The ejection fraction is calculated to be 25%. There are wall motion abnormalities in the left ventricle. There is no evidence of mitral regurgitation. The left ventricular end-diastolic pressure measured 20 mmHg.      Aortic Valve   No gradient noted across aortic valve on left heart pullback     Wall Motion   The following segments are akinetic: mid inferior. The following segments are hypokinetic: posterior, mid intravascular septum and basilar posterior. \"    Labs:     CBC:   Recent Labs     10/11/22  0341 10/12/22  0351   WBC 29.4* 37.9*   HGB 8.0* 8.0*   HCT 23.5* 23.9*    436     BMP:   Recent Labs     10/11/22  2040 10/11/22  2221 10/12/22  0351 10/12/22  0617 10/12/22  0844   *  --  132*  --   --    K 4.1   < > 4.1 4.3 4.0   CO2 17*  --  17*  --   --    BUN 37*  --  36*  --   --    CREATININE 2.01*  --  1.87*  --   --    LABGLOM 37*  --  40*  --   --    GLUCOSE 165*  --  199*  --   --     < > = values in this interval not displayed. BNP: No results for input(s): BNP in the last 72 hours. PT/INR:   Recent Labs     10/10/22  0342 10/10/22  1644   PROTIME 11.9 13.6*   INR 1.2 1.4     APTT:  Recent Labs     10/11/22  2015 10/12/22  0353   APTT 31.1* 42.8*     CARDIAC ENZYMES:No results for input(s): CKTOTAL, CKMB, CKMBINDEX, TROPONINI in the last 72 hours. FASTING LIPID PANEL:No results found for: HDL, LDLDIRECT, LDLCALC, TRIG  LIVER PROFILE:  Recent Labs     10/11/22  0341 10/12/22  0351   AST 94* 67*   * 103*   LABALBU 2.9* 3.0*       IMPRESSION:    Patient Active Problem List   Diagnosis    CKD (chronic kidney disease) stage 3, GFR 30-59 ml/min (HCC)    DM (diabetes mellitus) ([facility])    Hx of CABG    Benign essential HTN    Hyperparathyroidism ([facility] [address]    Cardiac arrest ([facility])    Hyperkalemia    Metabolic acidosis    Encounter for palliative care       -Recent hospitalization at [facility] for bradycardiac arrest. Cardiac cath-MV CAD LIMA-LAD patent. LM-LCX stent patent. OM1 occluded. OM2 patent stent. OM3 filled by collaterals from LAD. LVEF 25-30% and was discharged on Lifevest. -presented this hospitalization again w/ cardiac arrest. No report of shockable rhythm.   -concern anoxic brain injury  -ANGELIQUE-oliguria  -CKD/ESRD-dialysis dependent.    -HTN  -DM  -HLD    RECOMMENDATIONS:  Trops downtrending  Continue heparin-gtt  Can continue TTM per protocol  Resume home meds via ogt once more stabilized e.g. [id number], statin  D/w GOC w/ family if available-patient has had repeat arrest in under 10 days severe CAD, poor EF and on high-dose pressors currently-also minimally responsive on/off minimal sedation  Continue supportive treatment. Will needed dual chamber ICD prior to discharge pending clinical recovery  Obtain lifevest recordings from outside facility    Further recommendations/finalized plan pending d/w attending on rounds. Neeraj Ma MD  PGY-3, Department of Internal Medicine  [address] [place] [address]      Attending Physician Statement  I have discussed the care of Calixto Valdes , including pertinent history and exam findings,  with the cardiology fellow/resident. I have seen and examined the patient and the key elements of all parts of the encounter have been performed by me. I have completed at least one if not all key elements of the E/M (history, physical exam, and MDM). I agree with the assessment, plan and orders as documented by the resident .     Jerome Wilson MD, [facility]

## 2022-10-12 NOTE — PROCEDURES
Referring physician: Corbin PARRY  Date: 10/12/2022  Start Time:10/11/2022 @ [phone]  End Time: 10/12/2022 @ 1228    Indication  Patient with encephalopathy, EEG done to rule out subclinical seizures. Introduction  This continuous video-EEG was acquired using a [employer] workstation at 256 samples/s. Electrodes were placed according to the International 10-20 system. Automated spike and seizure detection algorithms were applied. Video was recorded during this study. Description  EEG was moderately limited due to EMG artifact, embedded there was suspicion for generalized periodic discharges, after paralytics given there was ~ 1 Hz generalized periodic discharges and at time seem more on the right. No consistent focal slowing or interhemispheric asymmetry was noted. Normal sleep structures were not observed. Events  Patient had frequent jaw clenching movement, it was not clear if correlating with generlaied periodic dicharges or not as EEG was limited with EMG artifact. Impression  Abnormal continuous vEEG recording, the slowing mentioned above suggests severe non specific encephalopathy. Jaw movements of unclear significance and need clinical correlation. Concerns for myoclonic epileptic activity. Please note these movement seem to stop (not seen on video) ~ 8 PM 10/11/2022    Generalized periodic discharges and lateralized periodic discharges can be seen on anoxic brain injury, epileptic, sedation vs others. Discussed with managing team.     Magen Hughes MD  Epilepsy Board Certified. Neurology Board Certified.     Electronically Signed

## 2022-10-12 NOTE — PROGRESS NOTES
Daily Progress Note  Neuro Critical Care    Patient Name: Kait Pedro Sr.  Patient : 1960  Room/Bed: 1399/5631-09  Code Status: DNR-CCA  Allergies: No Known Allergies    CHIEF COMPLAINT:        Cardiac Arrest     INTERVAL HISTORY    Initial Presentation (Admitted 10/10): The patient is a 60 y.o. male who presents with past medical history of CAD s/p CABG in , recent thrombectomy in cardiac catheterization in  and again on 10/4 at [address] (, CKD stage IV currently on dialysis (recently started, had tunneled dialysis cath placed on 10/6), essential hypertension, dyslipidemia, type 1 diabetes on insulin pump, recent cardiac arrest on 10/1/2022 currently has implanted LifeVest (LVEF 25-30%). Admitted to ICU yesterday. Unknown rounds of CPR. Unknown if v fib or PEA arrest. Received 2 rounds of epi, 1 dose  bicarb, 1 dose calcium and patient obtained ROSC. Patient was intubated in ED for airway support. Per ER documentation, Life Vest battery was noted to be dead. Per reports, patient had been unwell since his most recent discharge from [address] (admitted after bradycardic arrest). On day of arrest he was having difficulty breathing and complaining of fatigue then slumped over onto ground. No bystandard CPR was noted. Currently undergoing hypothermia protocol and on CVVHD. Patient was made DNR-CCA yesterday. Patient is currently intubated, on fent gtt. Currently on vanc and unasyn for concerns of sepsis. Hospital Course:   10/11: Loaded with keppra. Last 24h:     Concern for seizure like activity last night, loaded with Keppra, started on versed gtt. Remains on fentanyl gtt, versed gtt at 3/hr, levo and vaso at this time. No concern for clinical seizure activity overnight since initial event. Patient with some weak localization to pain in bilateral upper extremities, moreso in RUE than LUE, no movement in bilateral LE.     CURRENT MEDICATIONS:  SCHEDULED MEDICATIONS:   pantoprazole (PROTONIX) 40 mg injection  40 mg IntraVENous Q12H    midazolam        midazolam        midazolam        sodium chloride flush  5-40 mL IntraVENous 2 times per day    ampicillin-sulbactam  1,500 mg IntraVENous Q6H    chlorhexidine  15 mL Mouth/Throat BID     CONTINUOUS INFUSIONS:   CRRT dialysis builder 1,500 mL/hr (10/12/22 0210)    midazolam 3 mg/hr (10/12/22 0604)    fentaNYL 50 mcg/mL 100 mcg/hr (10/12/22 0604)    norepinephrine 34 mcg/min (10/12/22 0604)    sodium chloride 10 mL/hr at 10/12/22 0604    vasopressin (Septic Shock) infusion 0.04 Units/min (10/12/22 0604)    sodium bicarbonate infusion 100 mL/hr at 10/12/22 0604    heparin (PORCINE) Infusion 10 Units/kg/hr (10/12/22 0604)     PRN MEDICATIONS:   sodium chloride flush, sodium chloride, polyethylene glycol, acetaminophen **OR** acetaminophen, heparin (porcine), heparin (porcine), potassium chloride, magnesium sulfate, calcium gluconate **OR** calcium gluconate **OR** calcium gluconate **OR** calcium gluconate, sodium phosphate IVPB **OR** sodium phosphate IVPB **OR** sodium phosphate IVPB **OR** sodium phosphate IVPB, ondansetron **OR** ondansetron, heparin (porcine), heparin (porcine)    VITALS:  Temperature Range: Temp: (!) 91.6 °F (33.1 °C) Temp  Av.6 °F (33.1 °C)  Min: 91.6 °F (33.1 °C)  Max: 91.6 °F (33.1 °C)  BP Range: Systolic (00SKH), JBK:606 , Min:107 , XHA:881     Diastolic (58QUP), KZU:75, Min:40, Max:59    Pulse Range: Pulse  Av.4  Min: 56  Max: 89  Respiration Range: Resp  Av.6  Min: 0  Max: 18  Current Pulse Ox: SpO2: 100 %  24HR Pulse Ox Range: SpO2  Av.9 %  Min: 94 %  Max: 100 %  Patient Vitals for the past 12 hrs:   Pulse Resp SpO2   10/12/22 0430 89 -- 100 %   10/12/22 0415 88 -- 100 %   10/12/22 0400 87 -- 100 %   10/12/22 0345 88 -- 100 %   10/12/22 0330 86 -- 100 %   10/12/22 0315 86 -- 99 %   10/12/22 0300 82 -- 100 %   10/12/22 0245 81 -- 100 %   10/12/22 0230 83 -- 100 %   10/12/22 0215 77 -- 100 %   10/12/22 0200 83 -- 98 %   10/12/22 0145 81 -- 95 %   10/12/22 0130 79 -- 97 %   10/12/22 0115 76 -- 100 %   10/12/22 0100 78 18 100 %   10/12/22 0045 79 18 --   10/12/22 0030 81 18 --   10/12/22 0000 80 18 --   10/11/22 2345 80 18 --   10/11/22 2330 81 18 --   10/11/22 2315 79 18 --   10/11/22 2300 76 18 99 %   10/11/22 2245 74 18 100 %   10/11/22 2215 73 18 100 %   10/11/22 2200 70 18 --   10/11/22 2145 68 17 --   10/11/22 2130 77 (!) 9 --   10/11/22 2115 69 (!) 0 100 %   10/11/22 2100 67 (!) 0 100 %   10/11/22 2045 67 (!) 0 100 %   10/11/22 2030 67 (!) 0 100 %   10/11/22 2015 66 (!) 2 100 %   10/11/22 2000 65 (!) 0 100 %   10/11/22 1945 77 17 100 %   10/11/22 1930 79 17 95 %         RECENT LABS:   Lab Results   Component Value Date    WBC 37.9 (HH) 10/12/2022    HGB 8.0 (L) 10/12/2022    HCT 23.9 (L) 10/12/2022     10/12/2022     (H) 10/12/2022    AST 67 (H) 10/12/2022     (L) 10/12/2022    K 4.3 10/12/2022    CL 93 (L) 10/12/2022    CREATININE 1.87 (H) 10/12/2022    BUN 36 (H) 10/12/2022    CO2 17 (L) 10/12/2022    TSH 6.38 (H) 10/10/2022    INR 1.4 10/10/2022     24 HOUR INTAKE/OUTPUT:  Intake/Output Summary (Last 24 hours) at 10/12/2022 0727  Last data filed at 10/12/2022 0604  Gross per 24 hour   Intake 3860.34 ml   Output 3863 ml   Net -2.66 ml       Labs and Images reviewed with:  [x] Dr. Adi Campbell. Mikie    [] Dr. Roxy Kidd  [] Dr. Vinicius Mays  [] There are no new interval images to review. PHYSICAL EXAM       CONSTITUTIONAL:  Intubated, On Fentanyl and versed infusion. Does not open eyes. Does not track or follow commands   HEAD:  normocephalic, atraumatic    EYES:  PERRL   ENT:  ETT in place   NECK:  supple, symmetric   LUNGS:  Equal air entry bilaterally   CARDIOVASCULAR:  normal s1 / s2, RRR. On Levo, vaso, hep gtt   ABDOMEN:  Soft, no rigidity   NEUROLOGIC:  Mental Status:  Intubated and sedated. Does not follow commands.               Cranial Nerves: II: Visual fields:  abnormal - No blink to threat  III: Pupils:  equal, round, slugglishly reactive to light   Weak corneals bilaterally  Cough intact     Motor Exam:    Weakly localizes in the right upper extremities  No movement with bilateral lower extremities. DRAINS:  [] There are no drains for Neuro Critical Care to monitor at this time. 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 For any changes in exam or patient status please contact Neuro Critical Care.       John Wright DO  Neuro Critical Care  Pager [phone]  10/12/2022     7:27 AM

## 2022-10-12 NOTE — PLAN OF CARE
Problem: Safety - Medical Restraint  Goal: Remains free of injury from restraints (Restraint for Interference with Medical Device)  Description: INTERVENTIONS:  1. Determine that other, less restrictive measures have been tried or would not be effective before applying the restraint  2. Evaluate the patient's condition at the time of restraint application  3. Inform patient/family regarding the reason for restraint  4.  Q2H: Monitor safety, psychosocial status, comfort, nutrition and hydration  Outcome: Completed  Flowsheets  Taken 10/12/2022 0600 by Lucero Jolly RN  Remains free of injury from restraints (restraint for interference with medical device): Every 2 hours: Monitor safety, psychosocial status, comfort, nutrition and hydration  Taken 10/12/2022 0400 by Lucero Jolly RN  Remains free of injury from restraints (restraint for interference with medical device): Every 2 hours: Monitor safety, psychosocial status, comfort, nutrition and hydration  Taken 10/12/2022 0200 by Lucero Jolly RN  Remains free of injury from restraints (restraint for interference with medical device): Every 2 hours: Monitor safety, psychosocial status, comfort, nutrition and hydration  Taken 10/12/2022 0000 by Lucero Jolly RN  Remains free of injury from restraints (restraint for interference with medical device): Every 2 hours: Monitor safety, psychosocial status, comfort, nutrition and hydration

## 2022-10-12 NOTE — PROGRESS NOTES
INTENSIVE CARE UNIT  Resident Physician Progress Note    Patient - Rachel Merino Sr. Date of Admission -  10/10/2022  3:21 AM  Date of Evaluation -  10/12/2022  Room and Bed Number -  54 [facility] Day - 2  Chief Complaint   Patient presents with    Cardiac Arrest     Arrives per LF4 from scene. Per LF, pt was cardiac arrest per EMS. Pt with ROSC and transported to [facility] to be transported to [facility]. Pt received 2 rounds of epi, unknown amounts of fentanyl and versed. Pt received 1 amp Bicarb and 1 amp calcium chloride. Pt arrives intubated, no sedation. SUBJECTIVE:     HISTORY OF PRESENT ILLNESS:    Is a 70-year-old male with a history of CAD status post CABG in the 90s, recent cardiac catheterization a week ago, CK D, type 1 diabetes with an insulin pump in place, and a recent cardiac arrest on 10/1, has a LifeVest on. To [facility].  Unclear initial rhythm was found by EMS who did 2 rounds of epi, 1 dose of bicarb and ROSC was achieved. Patient was then life flighted to our emergency department. While being LifeFlight patient was given 410 mcg doses of push dose epi and started on Levophed. Has a new tunneled dialysis catheter placed on 10/6. Was intubated emergency department for airway protection. Patient was started on fentanyl. He had a intact pupillary response, cough and gag. Work-up showed a white blood cell count of 26.6, hemoglobin of 8.6, lactic acid of 6.2. GI was reviewed by cardiology with no concerns for STEMI. Critical care was consulted for admission due to patient being postarrest with hypoxic respiratory failure requiring mechanical ventilation. OVERNIGHT EVENTS:      Generalized periodic discharges on EEG seen around 8 PM yesterday evening. Patient was also found to have rhythmic jaw movements.   Neuro critical care evaluated at bedside and recommended 1.5 g of Keppra, 5 mg of vecuronium, 6 mg of Versed to achieve burst suppression and better EEG monitoring. Rewarming protocol began around 4 PM yesterday. TODAY:    Is on fentanyl drip and Versed drip. Still on pressure support with Levophed and vasopressin. Patient is on CRRT and and anuric. White blood cell count up to 37.9 from 29.4    PRBC 18/530/8/40 percent  ABG 7.38/18 point 7/136/11.3      OBJECTIVE:     VITAL SIGNS:  Patient Vitals for the past 8 hrs:   Pulse Resp SpO2   10/12/22 0430 89 -- 100 %   10/12/22 0415 88 -- 100 %   10/12/22 0400 87 -- 100 %   10/12/22 0345 88 -- 100 %   10/12/22 0330 86 -- 100 %   10/12/22 0315 86 -- 99 %   10/12/22 0300 82 -- 100 %   10/12/22 0245 81 -- 100 %   10/12/22 0230 83 -- 100 %   10/12/22 0215 77 -- 100 %   10/12/22 0200 83 -- 98 %   10/12/22 0145 81 -- 95 %   10/12/22 0130 79 -- 97 %   10/12/22 0115 76 -- 100 %   10/12/22 0100 78 18 100 %   10/12/22 0045 79 18 --   10/12/22 0030 81 18 --   10/12/22 0000 80 18 --   10/11/22 2345 80 18 --   10/11/22 2330 81 18 --   10/11/22 2315 79 18 --   10/11/22 2300 76 18 99 %       Last Body weight:   Wt Readings from Last 3 Encounters:   10/11/22 163 lb 9.3 oz (74.2 kg)   20 172 lb (78 kg)   10/11/19 177 lb (80.3 kg)       Body Mass Index : Body mass index is 29.92 kg/m². Tmax over 24 hours: Temp (24hrs), Av.6 °F (33.1 °C), Min:91.6 °F (33.1 °C), Max:91.6 °F (33.1 °C)      Ins/Outs:    In: 5850.6 [I.V.:5364.8; NG/GT:101]  Out: 5761 [Urine:25]    PHYSICAL EXAM:  Constitutional: Intubated sedated, not making purposeful movements  EENT: Pupils sluggishly reactive, ET tube in place  Neck: Supple, symmetrical, trachea midline  Respiratory: Mechanical breath sounds, diminished  Cardiovascular: regular rate and rhythm  Abdomen: soft, nontender, nondistended  Extremities:  peripheral pulses normal  MEDICATIONS:  Scheduled Meds:   pantoprazole (PROTONIX) 40 mg injection  40 mg IntraVENous Q12H    midazolam        midazolam        midazolam        sodium chloride flush  5-40 mL IntraVENous 2 times per day ampicillin-sulbactam  1,500 mg IntraVENous Q6H    chlorhexidine  15 mL Mouth/Throat BID       Continuous Infusions:   CRRT dialysis builder 1,500 mL/hr (10/12/22 0210)    midazolam 3 mg/hr (10/12/22 0604)    fentaNYL 50 mcg/mL 100 mcg/hr (10/12/22 0604)    norepinephrine 34 mcg/min (10/12/22 0604)    sodium chloride 10 mL/hr at 10/12/22 0604    vasopressin (Septic Shock) infusion 0.04 Units/min (10/12/22 0604)    sodium bicarbonate infusion 100 mL/hr at 10/12/22 0604    heparin (PORCINE) Infusion 10 Units/kg/hr (10/12/22 0604)       PRN Meds:   sodium chloride flush, 5-40 mL, PRN  sodium chloride, , PRN  polyethylene glycol, 17 g, Daily PRN  acetaminophen, 650 mg, Q6H PRN   Or  acetaminophen, 650 mg, Q6H PRN  heparin (porcine), 4,000 Units, PRN  heparin (porcine), 2,000 Units, PRN  potassium chloride, 20 mEq, PRN  magnesium sulfate, 1,000 mg, PRN  calcium gluconate, 1,000 mg, PRN   Or  calcium gluconate, 2,000 mg, PRN   Or  calcium gluconate, 3,000 mg, PRN   Or  calcium gluconate, 4,000 mg, PRN  sodium phosphate IVPB, 6 mmol, PRN   Or  sodium phosphate IVPB, 12 mmol, PRN   Or  sodium phosphate IVPB, 18 mmol, PRN   Or  sodium phosphate IVPB, 24 mmol, PRN  ondansetron, 4 mg, Q8H PRN   Or  ondansetron, 4 mg, Q6H PRN  heparin (porcine), 1,700 Units, PRN  heparin (porcine), 1,800 Units, PRN      SUPPORT DEVICES: [x] Ventilator [] BIPAP  [] Nasal Cannula [] Room Air    VENT SETTINGS (Comprehensive) (if applicable):    Vent Information  Ventilator ID: [id number]  Equipment Changed: HME, Expiratory Filter  Additional Respiratory Assessments  Heart Rate: 89  Resp: 18  SpO2: 100 %  End Tidal CO2: 24 (%)  Position: Semi-Jaquez's  Humidification Source: HME  Skin Barrier Applied: No  Lab Results   Component Value Date/Time    MODE [facility] 10/12/2022 04:27 AM       ABGs:     No results found for: PH, PCO2, PO2, HCO3, O2SAT    DATA:  Complete Blood Count:   Recent Labs     10/10/22  0759 10/11/22  0341 10/12/22  0351   WBC 33.1* 29.4* 37.9*   RBC 2.91* 2.74* 2.85*   HGB 8.2* 8.0* 8.0*   HCT 25.3* 23.5* 23.9*   MCV 86.9 85.8 83.9   MCH 28.2 29.2 28.1   MCHC 32.4 34.0 33.5   RDW 15.6* 15.8* 15.5*    334 436   MPV 10.7 10.6 10.9        Last 3 Blood Glucose:   Recent Labs     10/10/22  0759 10/10/22  1644 10/10/22  2131 10/11/22  0341 10/11/22  0929 10/11/22  1530 10/11/22  2040 10/12/22  0351   GLUCOSE 164* 135* 123* 130* 142* 155* 165* 199*        PT/INR:    Lab Results   Component Value Date/Time    PROTIME 13.6 10/10/2022 04:44 PM    INR 1.4 10/10/2022 04:44 PM     PTT:    Lab Results   Component Value Date/Time    APTT 42.8 10/12/2022 03:53 AM       Basic Metabolic Profile:   Recent Labs     10/11/22  1530 10/11/22  1800 10/11/22  2040 10/11/22  2221 10/12/22  0222 10/12/22  0351 10/12/22  0617     --  134*  --   --  132*  --    K 4.6   < > 4.1   < > 4.2 4.1 4.3   CL 96*  --  97*  --   --  93*  --    CO2 17*  --  17*  --   --  17*  --    BUN 39*  --  37*  --   --  36*  --    CREATININE 2.25*  --  2.01*  --   --  1.87*  --    GLUCOSE 155*  --  165*  --   --  199*  --    PHOS 5.2*  --  4.8*  --   --  4.8*  --     < > = values in this interval not displayed.        Liver Function:  Recent Labs     10/12/22  0351   PROT 5.1*   LABALBU 3.0*   *   AST 67*   ALKPHOS 188*   BILITOT 0.5       Magnesium:   Lab Results   Component Value Date/Time    MG 1.9 10/12/2022 03:51 AM    MG 1.8 10/11/2022 08:40 PM    MG 1.9 10/11/2022 03:30 PM     Phosphorus:   Lab Results   Component Value Date/Time    PHOS 4.8 10/12/2022 03:51 AM    PHOS 4.8 10/11/2022 08:40 PM    PHOS 5.2 10/11/2022 03:30 PM     Ionized Calcium:   Lab Results   Component Value Date/Time    CAION 1.15 10/12/2022 03:53 AM    CAION 1.12 10/11/2022 08:40 PM    CAION 1.15 10/11/2022 03:30 PM        Urinalysis:   Lab Results   Component Value Date/Time    NITRU NEGATIVE 10/10/2022 03:52 AM    COLORU Dark Yellow 10/10/2022 03:52 AM    PHUR 5.0 10/10/2022 03:52 AM    WBCUA 0 TO 2 10/10/2022 03:52 AM    RBCUA 2 TO 5 10/10/2022 03:52 AM    SPECGRAV 1.022 10/10/2022 03:52 AM    LEUKOCYTESUR TRACE 10/10/2022 03:52 AM    UROBILINOGEN Normal 10/10/2022 03:52 AM    BILIRUBINUR NEGATIVE  Verified by ictotest. 10/10/2022 03:52 AM    GLUCOSEU NEGATIVE 10/10/2022 03:52 AM    KETUA TRACE 10/10/2022 03:52 AM       HgBA1c:  No results found for: LABA1C  TSH:    Lab Results   Component Value Date/Time    TSH 6.38 10/10/2022 03:42 AM     Lactic Acid: No results found for: LACTA   Troponin: No results for input(s): TROPONINI in the last 72 hours. Other Labs:  Results for orders placed or performed during the hospital encounter of 10/10/22   Blood Culture 1    Specimen: Blood   Result Value Ref Range    Specimen Description . BLOOD     Special Requests LT AC 10ML     Culture NO GROWTH 2 DAYS    Culture, Urine    Specimen: Urine, straight catheter   Result Value Ref Range    Specimen Description . URINE,STRAIGHT CATHETER     Culture NO GROWTH    Culture, Urine    Specimen: Urine, clean catch   Result Value Ref Range    Specimen Description . CLEAN CATCH URINE     Culture DUPLICATE ORDER    MRSA DNA Probe, Nasal    Specimen: Nasal   Result Value Ref Range    Specimen Description . NASAL SWAB     MRSA, DNA, Nasal NEGATIVE NEGATIVE   Culture, Blood 1    Specimen: Blood   Result Value Ref Range    Specimen Description . BLOOD     Special Requests 1.5 RIGHT HAND     Culture NO GROWTH 1 DAY    CMP   Result Value Ref Range    Glucose 115 (H) 70 - 99 mg/dL    BUN 61 (H) 8 - 23 mg/dL    Creatinine 4.60 (H) 0.70 - 1.20 mg/dL    Est, Glom Filt Rate 14 (L) >60 mL/min/1.73m2    Calcium 8.5 (L) 8.6 - 10.4 mg/dL    Sodium 127 (L) 135 - 144 mmol/L    Potassium 5.8 (H) 3.7 - 5.3 mmol/L    Chloride 91 (L) 98 - 107 mmol/L    CO2 17 (L) 20 - 31 mmol/L    Anion Gap 19 (H) 9 - 17 mmol/L    Alkaline Phosphatase 204 (H) 40 - 129 U/L     (H) 5 - 41 U/L     (H) <40 U/L    Total Bilirubin 0.4 0.3 - 1.2 mg/dL    Total Protein 5.5 (L) 6.4 - 8.3 g/dL    Albumin 3.3 (L) 3.5 - 5.2 g/dL    Albumin/Globulin Ratio 1.5 1.0 - 2.5   CBC with Auto Differential   Result Value Ref Range    WBC 26.6 (H) 3.5 - 11.3 k/uL    RBC 3.07 (L) 4.21 - 5.77 m/uL    Hemoglobin 8.6 (L) 13.0 - 17.0 g/dL    Hematocrit 27.5 (L) 40.7 - 50.3 %    MCV 89.6 82.6 - 102.9 fL    MCH 28.0 25.2 - 33.5 pg    MCHC 31.3 28.4 - 34.8 g/dL    RDW 15.8 (H) 11.8 - 14.4 %    Platelets 236 913 - 800 k/uL    MPV 10.9 8.1 - 13.5 fL    NRBC Automated 0.1 (H) 0.0 per 100 WBC    Immature Granulocytes 10 (H) 0 %    Seg Neutrophils 78 (H) 36 - 66 %    Lymphocytes 5 (L) 24 - 44 %    Monocytes 6 1 - 7 %    Eosinophils % 1 1 - 4 %    Basophils 0 0 - 2 %    Absolute Immature Granulocyte 2.66 (H) 0.00 - 0.30 k/uL    Segs Absolute 20.74 (H) 1.8 - 7.7 k/uL    Absolute Lymph # 1.33 1.0 - 4.8 k/uL    Absolute Mono # 1.60 (H) 0.1 - 0.8 k/uL    Absolute Eos # 0.27 0.0 - 0.4 k/uL    Basophils Absolute 0.00 0.0 - 0.2 k/uL    Morphology ANISOCYTOSIS PRESENT     Morphology 1+ ACANTHOCYTES    Troponin   Result Value Ref Range    Troponin, High Sensitivity 7,513 (HH) 0 - 22 ng/L   Brain Natriuretic Peptide   Result Value Ref Range    Pro-BNP 82,715 (H) <300 pg/mL   Lipase   Result Value Ref Range    Lipase 31 13 - 60 U/L   Lactic Acid   Result Value Ref Range    Lactic Acid, Whole Blood 6.2 (H) 0.7 - 2.1 mmol/L   Protime-INR   Result Value Ref Range    Protime 11.9 9.1 - 12.3 sec    INR 1.2    APTT   Result Value Ref Range    PTT 24.0 20.5 - 30.5 sec   TSH   Result Value Ref Range    TSH 6.38 (H) 0.30 - 5.00 uIU/mL   Urinalysis with Microscopic   Result Value Ref Range    Color, UA Dark Yellow (A) Yellow    Turbidity UA Cloudy (A) Clear    Glucose, Ur NEGATIVE NEGATIVE    Bilirubin Urine NEGATIVE  Verified by ictotest. (A) NEGATIVE    Ketones, Urine TRACE (A) NEGATIVE    Specific Gravity, UA 1.022 1.005 - 1.030    Urine Hgb NEGATIVE NEGATIVE    pH, UA 5.0 5.0 - 8.0    Protein, UA 2+ (A) NEGATIVE Urobilinogen, Urine Normal Normal    Nitrite, Urine NEGATIVE NEGATIVE    Leukocyte Esterase, Urine TRACE (A) NEGATIVE    WBC, UA 0 TO 2 0 - 5 /HPF    RBC, UA 2 TO 5 0 - 2 /HPF    Casts UA 10 TO 20 0 - 2 /LPF    Casts UA HYALINE 0 - 2 /LPF    Epithelial Cells UA 0 TO 2 0 - 5 /HPF   ELECTROLYTES PLUS   Result Value Ref Range    POC Sodium 127 (L) 138 - 146 mmol/L    POC Potassium 5.6 (H) 3.5 - 4.5 mmol/L    POC Chloride 97 (L) 98 - 107 mmol/L    POC TCO2 18 (L) 22 - 30 mmol/L    Anion Gap 13 7 - 16 mmol/L   Hemoglobin and hematocrit, blood   Result Value Ref Range    POC Hemoglobin 8.0 (L) 13.5 - 17.5 g/dL    POC Hematocrit 23 (L) 41 - 53 %   CALCIUM, IONIC (POC)   Result Value Ref Range    POC Ionized Calcium 1.11 (L) 1.15 - 1.33 mmol/L   Troponin   Result Value Ref Range    Troponin, High Sensitivity 6,838 (HH) 0 - 22 ng/L   ELECTROLYTES PLUS   Result Value Ref Range    POC Sodium 126 (L) 138 - 146 mmol/L    POC Potassium 5.0 (H) 3.5 - 4.5 mmol/L    POC Chloride 96 (L) 98 - 107 mmol/L    POC TCO2 19 (L) 22 - 30 mmol/L    Anion Gap 12 7 - 16 mmol/L   Hemoglobin and hematocrit, blood   Result Value Ref Range    POC Hemoglobin 8.3 (L) 13.5 - 17.5 g/dL    POC Hematocrit 24 (L) 41 - 53 %   CALCIUM, IONIC (POC)   Result Value Ref Range    POC Ionized Calcium 1.10 (L) 1.15 - 1.33 mmol/L   Basic Metabolic Panel   Result Value Ref Range    Glucose 164 (H) 70 - 99 mg/dL    BUN 63 (H) 8 - 23 mg/dL    Creatinine 4.30 (H) 0.70 - 1.20 mg/dL    Est, Glom Filt Rate 15 (L) >60 mL/min/1.73m2    Calcium 8.5 (L) 8.6 - 10.4 mg/dL    Sodium 126 (L) 135 - 144 mmol/L    Potassium 4.9 3.7 - 5.3 mmol/L    Chloride 93 (L) 98 - 107 mmol/L    CO2 16 (L) 20 - 31 mmol/L    Anion Gap 17 9 - 17 mmol/L   CBC   Result Value Ref Range    WBC 33.1 (HH) 3.5 - 11.3 k/uL    RBC 2.91 (L) 4.21 - 5.77 m/uL    Hemoglobin 8.2 (L) 13.0 - 17.0 g/dL    Hematocrit 25.3 (L) 40.7 - 50.3 %    MCV 86.9 82.6 - 102.9 fL    MCH 28.2 25.2 - 33.5 pg    MCHC 32.4 28.4 - 34.8 g/dL    RDW 15.6 (H) 11.8 - 14.4 %    Platelets 872 231 - 250 k/uL    MPV 10.7 8.1 - 13.5 fL    NRBC Automated 0.1 (H) 0.0 per 100 WBC   Magnesium   Result Value Ref Range    Magnesium 2.2 1.6 - 2.6 mg/dL   Phosphorus   Result Value Ref Range    Phosphorus 6.4 (H) 2.5 - 4.5 mg/dL   Troponin   Result Value Ref Range    Troponin, High Sensitivity 7,237 (HH) 0 - 22 ng/L   SPECIMEN REJECTION   Result Value Ref Range    Specimen Source . BLOOD     Ordered Test IOCAL, LACTIC, PTT     Reason for Rejection Unable to perform testing: Specimen clotted.     Calcium, Ionized   Result Value Ref Range    Calcium, Ionized 1.16 1.13 - 1.33 mmol/L   Lactic Acid   Result Value Ref Range    Lactic Acid, Whole Blood 1.9 0.7 - 2.1 mmol/L   APTT   Result Value Ref Range    PTT 21.9 20.5 - 30.5 sec   Basic Metabolic Panel   Result Value Ref Range    Glucose 135 (H) 70 - 99 mg/dL    BUN 56 (H) 8 - 23 mg/dL    Creatinine 3.48 (H) 0.70 - 1.20 mg/dL    Est, Glom Filt Rate 19 (L) >60 mL/min/1.73m2    Calcium 8.4 (L) 8.6 - 10.4 mg/dL    Sodium 131 (L) 135 - 144 mmol/L    Potassium 5.2 3.7 - 5.3 mmol/L    Chloride 94 (L) 98 - 107 mmol/L    CO2 20 20 - 31 mmol/L    Anion Gap 17 9 - 17 mmol/L   Basic Metabolic Panel   Result Value Ref Range    Glucose 123 (H) 70 - 99 mg/dL    BUN 50 (H) 8 - 23 mg/dL    Creatinine 3.11 (H) 0.70 - 1.20 mg/dL    Est, Glom Filt Rate 22 (L) >60 mL/min/1.73m2    Calcium 8.3 (L) 8.6 - 10.4 mg/dL    Sodium 129 (L) 135 - 144 mmol/L    Potassium 4.9 3.7 - 5.3 mmol/L    Chloride 95 (L) 98 - 107 mmol/L    CO2 19 (L) 20 - 31 mmol/L    Anion Gap 15 9 - 17 mmol/L   Magnesium   Result Value Ref Range    Magnesium 2.2 1.6 - 2.6 mg/dL   Magnesium   Result Value Ref Range    Magnesium 2.0 1.6 - 2.6 mg/dL   Phosphorus   Result Value Ref Range    Phosphorus 5.5 (H) 2.5 - 4.5 mg/dL   Phosphorus   Result Value Ref Range    Phosphorus 5.4 (H) 2.5 - 4.5 mg/dL   Calcium, Ionized   Result Value Ref Range    Calcium, Ionized 1.20 1.13 - 1.33 mmol/L   Calcium, Ionized   Result Value Ref Range    Calcium, Ionized 1.15 1.13 - 1.33 mmol/L   Lactic Acid   Result Value Ref Range    Lactic Acid, Whole Blood 3.1 (H) 0.7 - 2.1 mmol/L   APTT   Result Value Ref Range    PTT >120.0 (HH) 20.5 - 30.5 sec   Protime-INR   Result Value Ref Range    Protime 13.6 (H) 9.1 - 12.3 sec    INR 1.4    Lactic Acid   Result Value Ref Range    Lactic Acid, Whole Blood 4.8 (H) 0.7 - 2.1 mmol/L   Comprehensive Metabolic Panel   Result Value Ref Range    Glucose 130 (H) 70 - 99 mg/dL    BUN 46 (H) 8 - 23 mg/dL    Creatinine 2.74 (H) 0.70 - 1.20 mg/dL    Est, Glom Filt Rate 25 (L) >60 mL/min/1.73m2    Calcium 8.3 (L) 8.6 - 10.4 mg/dL    Sodium 131 (L) 135 - 144 mmol/L    Potassium 4.7 3.7 - 5.3 mmol/L    Chloride 96 (L) 98 - 107 mmol/L    CO2 20 20 - 31 mmol/L    Anion Gap 15 9 - 17 mmol/L    Alkaline Phosphatase 174 (H) 40 - 129 U/L     (H) 5 - 41 U/L    AST 94 (H) <40 U/L    Total Bilirubin 0.5 0.3 - 1.2 mg/dL    Total Protein 5.1 (L) 6.4 - 8.3 g/dL    Albumin 2.9 (L) 3.5 - 5.2 g/dL    Albumin/Globulin Ratio 1.3 1.0 - 2.5   CBC with Auto Differential   Result Value Ref Range    WBC 29.4 (H) 3.5 - 11.3 k/uL    RBC 2.74 (L) 4.21 - 5.77 m/uL    Hemoglobin 8.0 (L) 13.0 - 17.0 g/dL    Hematocrit 23.5 (L) 40.7 - 50.3 %    MCV 85.8 82.6 - 102.9 fL    MCH 29.2 25.2 - 33.5 pg    MCHC 34.0 28.4 - 34.8 g/dL    RDW 15.8 (H) 11.8 - 14.4 %    Platelets 663 930 - 077 k/uL    MPV 10.6 8.1 - 13.5 fL    NRBC Automated 0.1 (H) 0.0 per 100 WBC    Immature Granulocytes 0 0 %    Seg Neutrophils 88 (H) 36 - 66 %    Lymphocytes 10 (L) 24 - 44 %    Monocytes 2 1 - 7 %    Eosinophils % 0 (L) 1 - 4 %    Basophils 0 0 - 2 %    Absolute Immature Granulocyte 0.00 0.00 - 0.30 k/uL    Segs Absolute 25.87 (H) 1.8 - 7.7 k/uL    Absolute Lymph # 2.94 1.0 - 4.8 k/uL    Absolute Mono # 0.59 0.1 - 0.8 k/uL    Absolute Eos # 0.00 0.0 - 0.4 k/uL    Basophils Absolute 0.00 0.0 - 0.2 k/uL    Morphology ANISOCYTOSIS PRESENT    APTT   Result Value Ref Range    PTT 53.6 (H) 20.5 - 30.5 sec   Basic Metabolic Panel   Result Value Ref Range    Glucose 142 (H) 70 - 99 mg/dL    BUN 41 (H) 8 - 23 mg/dL    Creatinine 2.52 (H) 0.70 - 1.20 mg/dL    Est, Glom Filt Rate 28 (L) >60 mL/min/1.73m2    Calcium 8.6 8.6 - 10.4 mg/dL    Sodium 137 135 - 144 mmol/L    Potassium 4.8 3.7 - 5.3 mmol/L    Chloride 97 (L) 98 - 107 mmol/L    CO2 20 20 - 31 mmol/L    Anion Gap 20 (H) 9 - 17 mmol/L   Calcium, Ionized   Result Value Ref Range    Calcium, Ionized 1.15 1.13 - 1.33 mmol/L   Calcium, Ionized   Result Value Ref Range    Calcium, Ionized 1.18 1.13 - 1.33 mmol/L   Magnesium   Result Value Ref Range    Magnesium 1.9 1.6 - 2.6 mg/dL   Phosphorus   Result Value Ref Range    Phosphorus 5.3 (H) 2.5 - 4.5 mg/dL   Lactic Acid   Result Value Ref Range    Lactic Acid, Whole Blood 2.6 (H) 0.7 - 2.1 mmol/L   Lactic Acid   Result Value Ref Range    Lactic Acid, Whole Blood 4.0 (H) 0.7 - 2.1 mmol/L   APTT   Result Value Ref Range    PTT 43.5 (H) 20.5 - 30.5 sec   Basic Metabolic Panel   Result Value Ref Range    Glucose 155 (H) 70 - 99 mg/dL    BUN 39 (H) 8 - 23 mg/dL    Creatinine 2.25 (H) 0.70 - 1.20 mg/dL    Est, Glom Filt Rate 32 (L) >60 mL/min/1.73m2    Calcium 8.5 (L) 8.6 - 10.4 mg/dL    Sodium 135 135 - 144 mmol/L    Potassium 4.6 3.7 - 5.3 mmol/L    Chloride 96 (L) 98 - 107 mmol/L    CO2 17 (L) 20 - 31 mmol/L    Anion Gap 22 (H) 9 - 17 mmol/L   Calcium, Ionized   Result Value Ref Range    Calcium, Ionized 1.15 1.13 - 1.33 mmol/L   Magnesium   Result Value Ref Range    Magnesium 1.9 1.6 - 2.6 mg/dL   Phosphorus   Result Value Ref Range    Phosphorus 5.2 (H) 2.5 - 4.5 mg/dL   Lactic Acid   Result Value Ref Range    Lactic Acid, Whole Blood 3.4 (H) 0.7 - 2.1 mmol/L   Magnesium   Result Value Ref Range    Magnesium 1.9 1.6 - 2.6 mg/dL   Phosphorus   Result Value Ref Range    Phosphorus 4.8 (H) 2.5 - 4.5 mg/dL   APTT   Result Value Ref Range .5 (HH) 20.5 - 30.5 sec   Troponin   Result Value Ref Range    Troponin, High Sensitivity 2,711 (HH) 0 - 22 ng/L   Basic Metabolic Panel   Result Value Ref Range    Glucose 165 (H) 70 - 99 mg/dL    BUN 37 (H) 8 - 23 mg/dL    Creatinine 2.01 (H) 0.70 - 1.20 mg/dL    Est, Glom Filt Rate 37 (L) >60 mL/min/1.73m2    Calcium 8.4 (L) 8.6 - 10.4 mg/dL    Sodium 134 (L) 135 - 144 mmol/L    Potassium 4.1 3.7 - 5.3 mmol/L    Chloride 97 (L) 98 - 107 mmol/L    CO2 17 (L) 20 - 31 mmol/L    Anion Gap 20 (H) 9 - 17 mmol/L   Calcium, Ionized   Result Value Ref Range    Calcium, Ionized 1.12 (L) 1.13 - 1.33 mmol/L   Magnesium   Result Value Ref Range    Magnesium 1.8 1.6 - 2.6 mg/dL   Phosphorus   Result Value Ref Range    Phosphorus 4.8 (H) 2.5 - 4.5 mg/dL   APTT   Result Value Ref Range    PTT 31.1 (H) 20.5 - 30.5 sec   Calcium, Ionized   Result Value Ref Range    Calcium, Ionized 1.15 1.13 - 1.33 mmol/L   Potassium   Result Value Ref Range    Potassium 4.6 3.7 - 5.3 mmol/L   Potassium   Result Value Ref Range    Potassium 4.3 3.7 - 5.3 mmol/L   Potassium   Result Value Ref Range    Potassium 4.1 3.7 - 5.3 mmol/L   Potassium   Result Value Ref Range    Potassium 4.2 3.7 - 5.3 mmol/L   Comprehensive Metabolic Panel   Result Value Ref Range    Glucose 199 (H) 70 - 99 mg/dL    BUN 36 (H) 8 - 23 mg/dL    Creatinine 1.87 (H) 0.70 - 1.20 mg/dL    Est, Glom Filt Rate 40 (L) >60 mL/min/1.73m2    Calcium 8.4 (L) 8.6 - 10.4 mg/dL    Sodium 132 (L) 135 - 144 mmol/L    Potassium 4.1 3.7 - 5.3 mmol/L    Chloride 93 (L) 98 - 107 mmol/L    CO2 17 (L) 20 - 31 mmol/L    Anion Gap 22 (H) 9 - 17 mmol/L    Alkaline Phosphatase 188 (H) 40 - 129 U/L     (H) 5 - 41 U/L    AST 67 (H) <40 U/L    Total Bilirubin 0.5 0.3 - 1.2 mg/dL    Total Protein 5.1 (L) 6.4 - 8.3 g/dL    Albumin 3.0 (L) 3.5 - 5.2 g/dL    Albumin/Globulin Ratio 1.4 1.0 - 2.5   CBC with Auto Differential   Result Value Ref Range    WBC 37.9 (HH) 3.5 - 11.3 k/uL    RBC 2.85 (L) 4.21 - 5.77 m/uL    Hemoglobin 8.0 (L) 13.0 - 17.0 g/dL    Hematocrit 23.9 (L) 40.7 - 50.3 %    MCV 83.9 82.6 - 102.9 fL    MCH 28.1 25.2 - 33.5 pg    MCHC 33.5 28.4 - 34.8 g/dL    RDW 15.5 (H) 11.8 - 14.4 %    Platelets 038 978 - 207 k/uL    MPV 10.9 8.1 - 13.5 fL    NRBC Automated 0.2 (H) 0.0 per 100 WBC    Seg Neutrophils PENDING %    Lymphocytes PENDING %    Monocytes PENDING %    Eosinophils % PENDING %    Basophils PENDING %    Immature Granulocytes PENDING 0 %    Segs Absolute PENDING k/uL    Absolute Lymph # PENDING k/uL    Absolute Mono # PENDING k/uL    Absolute Eos # PENDING k/uL    Basophils Absolute PENDING 0.0 - 0.2 k/uL    Absolute Immature Granulocyte PENDING 0.00 - 0.30 k/uL   Potassium   Result Value Ref Range    Potassium 4.3 3.7 - 5.3 mmol/L   APTT   Result Value Ref Range    PTT 42.8 (H) 20.5 - 30.5 sec   Magnesium   Result Value Ref Range    Magnesium 1.9 1.6 - 2.6 mg/dL   Phosphorus   Result Value Ref Range    Phosphorus 4.8 (H) 2.5 - 4.5 mg/dL   Venous Blood Gas, POC   Result Value Ref Range    pH, Ridge 7.212 (L) 7.320 - 7.430    pCO2, Ridge 44.1 41.0 - 51.0 mm Hg    pO2, Ridge 18.5 (L) 30.0 - 50.0 mm Hg    HCO3, Venous 17.7 (L) 22.0 - 29.0 mmol/L    Negative Base Excess, Ridge 10 (H) 0.0 - 2.0    O2 Sat, Ridge 20 (L) 60.0 - 85.0 %   Creatinine W/GFR Point of Care   Result Value Ref Range    POC Creatinine 4.09 (H) 0.51 - 1.19 mg/dL    GFR Comment 18 (L) >60 mL/min    GFR Non-African American 15 (L) >60 mL/min    eGFR, POC 16 mL/min/1.73m2   POCT urea (BUN)   Result Value Ref Range    POC BUN 69 (H) 8 - 26 mg/dL   Lactic Acid, POC   Result Value Ref Range    POC Lactic Acid 5.10 (H) 0.56 - 1.39 mmol/L   POCT Glucose   Result Value Ref Range    POC Glucose 88 74 - 100 mg/dL   Arterial Blood Gas, POC   Result Value Ref Range    POC pH 7.327 (L) 7.350 - 7.450    POC pCO2 35.7 35.0 - 48.0 mm Hg    POC PO2 243.2 (H) 83.0 - 108.0 mm Hg    POC HCO3 18.7 (L) 21.0 - 28.0 mmol/L    Negative Base Excess, Art 7 (H) 0.0 - 2.0    POC O2  (H) 94.0 - 98.0 %    Sample Site Arterial Line     Mode PRVC     FIO2 100.0    Creatinine W/GFR Point of Care   Result Value Ref Range    POC Creatinine 4.51 (H) 0.51 - 1.19 mg/dL    GFR Comment 16 (L) >60 mL/min    GFR Non- 13 (L) >60 mL/min    eGFR, POC 14 mL/min/1.73m2   POCT urea (BUN)   Result Value Ref Range    POC BUN 67 (H) 8 - 26 mg/dL   Lactic Acid, POC   Result Value Ref Range    POC Lactic Acid 2.21 (H) 0.56 - 1.39 mmol/L   POCT Glucose   Result Value Ref Range    POC Glucose 266 (H) 74 - 100 mg/dL   POC Glucose Fingerstick   Result Value Ref Range    POC Glucose 165 (H) 75 - 110 mg/dL   POC Glucose Fingerstick   Result Value Ref Range    POC Glucose 127 (H) 75 - 110 mg/dL   POC Glucose Fingerstick   Result Value Ref Range    POC Glucose 123 (H) 75 - 110 mg/dL   Arterial Blood Gas, POC   Result Value Ref Range    POC pH 7.294 (L) 7.350 - 7.450    POC pCO2 37.6 35.0 - 48.0 mm Hg    POC PO2 99.8 83.0 - 108.0 mm Hg    POC HCO3 18.2 (L) 21.0 - 28.0 mmol/L    Negative Base Excess, Art 8 (H) 0.0 - 2.0    POC O2 SAT 97 94.0 - 98.0 %    Roger Test NOT APPLICABLE     Sample Site Arterial Line     FIO2 50.0     Pt Temp 32.5     POC pH Temp 7.36     POC pCO2 Temp 31 mm Hg    POC pO2 Temp 76 mm Hg   POC Glucose Fingerstick   Result Value Ref Range    POC Glucose 119 (H) 75 - 110 mg/dL   Arterial Blood Gas, POC   Result Value Ref Range    POC pH 7.349 (L) 7.350 - 7.450    POC pCO2 38.6 35.0 - 48.0 mm Hg    POC PO2 153.1 (H) 83.0 - 108.0 mm Hg    POC HCO3 21.3 21.0 - 28.0 mmol/L    Negative Base Excess, Art 4 (H) 0.0 - 2.0    POC O2 SAT 99 (H) 94.0 - 98.0 %    O2 Device/Flow/% Adult Ventilator     Roger Test NEGATIVE     Sample Site Arterial Line     FIO2 50.0    POCT Glucose   Result Value Ref Range    POC Glucose 123 (H) 74 - 100 mg/dL   Arterial Blood Gas, POC   Result Value Ref Range    POC pH 7.388 7.350 - 7.450    POC pCO2 18.7 (L) 35.0 - 48.0 mm Hg    POC PO2 136.0 (H) 83.0 - 108.0 mm Hg    POC HCO3 11.3 (L) 21.0 - 28.0 mmol/L    Negative Base Excess, Art 13 (H) 0.0 - 2.0    POC O2 SAT 99 (H) 94.0 - 98.0 %    O2 Device/Flow/% Adult Ventilator     Roger Test NOT APPLICABLE     Sample Site Arterial Line     Mode PRVC     FIO2 40.0    POCT Glucose   Result Value Ref Range    POC Glucose 131 (H) 74 - 100 mg/dL   EKG 12 Lead   Result Value Ref Range    Ventricular Rate 57 BPM    Atrial Rate 57 BPM    P-R Interval 154 ms    QRS Duration 126 ms    Q-T Interval 466 ms    QTc Calculation (Bazett) 453 ms    P Axis 59 degrees    R Axis -29 degrees    T Axis 149 degrees   EKG 12 Lead   Result Value Ref Range    Ventricular Rate 58 BPM    Atrial Rate 58 BPM    P-R Interval 160 ms    QRS Duration 112 ms    Q-T Interval 426 ms    QTc Calculation (Bazett) 418 ms    P Axis 62 degrees    R Axis -30 degrees    T Axis 180 degrees   EKG 12 Lead   Result Value Ref Range    Ventricular Rate 59 BPM    Atrial Rate 59 BPM    P-R Interval 166 ms    QRS Duration 124 ms    Q-T Interval 444 ms    QTc Calculation (Bazett) 439 ms    P Axis 60 degrees    R Axis -19 degrees    T Axis 172 degrees       Radiology/Imaging:  XR ABDOMEN (KUB) (SINGLE AP VIEW)   Final Result      Endotracheal tube is in appropriate position. NG tube is in appropriate   position. Right central line distal tip is within the inferior aspect of right atrium. Bilateral femoral lines are in place with the right femoral line terminating   at the cavoatrial junction and left femoral line terminating in the mid   pelvis. Unchanged extensive consolidative changes of left lung and small left pleural   effusion and unchanged increased interstitial markings of both lungs. .      Unchanged 9 mm linear density projecting over left lower lung field. It is   unclear whether the finding is aspirated into the left lung or is in the   overlying soft tissues of the chest wall.   If there is need for further   evaluation chest CT can be obtained. XR CHEST (SINGLE VIEW FRONTAL)   Final Result      Endotracheal tube is in appropriate position. NG tube is in appropriate   position. Right central line distal tip is within the inferior aspect of right atrium. Bilateral femoral lines are in place with the right femoral line terminating   at the cavoatrial junction and left femoral line terminating in the mid   pelvis. Unchanged extensive consolidative changes of left lung and small left pleural   effusion and unchanged increased interstitial markings of both lungs. .      Unchanged 9 mm linear density projecting over left lower lung field. It is   unclear whether the finding is aspirated into the left lung or is in the   overlying soft tissues of the chest wall. If there is need for further   evaluation chest CT can be obtained. XR ABDOMEN (KUB) (SINGLE AP VIEW)   Final Result   1. Endotracheal tube remains in appropriate position. 2.  Enteric tube terminates at the level the body of the stomach. 3.  Femoral lines in place, as described. 4.  Bilateral airspace disease and small left effusion again noted, although   less prominent in the interval.         XR CHEST PORTABLE   Final Result   1. Endotracheal tube remains in appropriate position. 2.  Enteric tube terminates at the level the body of the stomach. 3.  Femoral lines in place, as described. 4.  Bilateral airspace disease and small left effusion again noted, although   less prominent in the interval.         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 Old right basal ganglionic lacune. No acute intracranial abnormality. XR CHEST PORTABLE   Final Result   1. Endotracheal tube measures approximately 6-7 cm above the expected   location the joie, somewhat difficult to see given overlapping lines leads   and median sternotomy wires.    2. Diffuse parenchymal infiltrates are seen in the perihilar regions and   throughout the lungs bilaterally, which may represent diffuse pulmonary edema   and/or multifocal pneumonia. 3. Postoperative changes from prior open heart surgery, with coronary stents   noted. 4. Probable 5th anterolateral right rib fracture with minimal displacement. No pneumothorax. MRI BRAIN WO CONTRAST    (Results Pending)       ASSESSMENT:     Patient Active Problem List    Diagnosis Date Noted    Encounter for palliative care 10/11/2022    Cardiac arrest ([facility] 10/10/2022    Hyperkalemia     Metabolic acidosis     [facility] ([facility] 2017    CKD (chronic kidney disease) stage 3, GFR 30-59 ml/min ([facility]) 01/15/2016    DM (diabetes mellitus) ([facility] 01/15/2016    Hx of CABG 01/15/2016    Benign essential HTN 01/15/2016        PLAN:       PLAN/MEDICAL DECISION MAKING:  Neurologic:   Sedated, Versed gtt. fentanyl gtt.   Neurochecks per protocol  Neuro critical care consult  Hypothermia protocol, patient rewarmed  Follow-up MRI brain  Cardiovascular:  Levophed, vasopressin  MAP goal 65  Heparin GTT due to elevated troponins  Trend troponins  Cardiology consult  Pulmonary:  Maintain oxygen sats >92%  Pulmonary toilet  Vent Information  Ventilator ID: [id number]  Equipment Changed: HME, Expiratory Filter  GI/Nutrition    Ulcer Prophylaxis: PPI   Diet:Diet NPO  ADULT TUBE FEEDING; Orogastric; Renal Formula; Continuous; 10; No; 30; Q 4 hours  Renal/Fluid/Electrolyte  Sodium bicarb 75 mEq and sodium chloride 0.45 at 100 mL/h  I/O: In: 5850.6 [I.V.:5364.8; NG/GT:101]  Out: 3601 [Urine:25]  Nephrology on board, continue CVVHD  Monitor electrolytes, replace PRN   ID  WBC:   Lab Results   Component Value Date    WBC 37.9 (HH) 10/12/2022     Tmax: Temp (24hrs), Av.6 °F (33.1 °C), Min:91.6 °F (33.1 °C), Max:91.6 °F (33.1 °C)    Antimicrobials: Unasyn  Hematology:  Recent Labs     10/10/22  0759 10/11/22  0341 10/12/22  0351   HGB 8.2* 8.0* 8.0* stable  Endocrine:   glucose controlled - most recent BGL is   Recent Labs     10/11/22  1530 10/11/22  2040 10/12/22  0351   GLUCOSE 155* 165* 199*     DVT Prophylaxis  Heparin  Discharge Needs:  PT, OT, ST, SW, and Case Management      CODE STATUS: DNR-CCA  Palliative care consult  Family planning to go CC in the evening       DISPOSITION:  [x] To remain ICU: Mechanical ventilation  [] OK for out of ICU from [facility], MD  Emergency Medicine [address]  10/12/2022, 6:02 PM EDT  Attending Physician Statement  I have discussed the care of Samuel Puga, including pertinent history and exam findings,  with the resident. I have seen and examined the patient and the key elements of all parts of the encounter have been performed by me. I agree with the assessment, plan and orders as documented by the resident with additions . Progressive cardiogenic shock   Wife updated   Awaiting son to arrive before proceeding with [facility]         Total critical care time caring for this patient with life threatening, unstable organ failure, including direct patient contact, management of life support systems, review of data including imaging and labs, discussions with other team members and physicians at least 27   Min so far today, excluding procedures. Electronically signed by Ervin Okeefe MD on   10/12/22 at 9:51 PM EDT    Please note that this chart was generated using voice recognition Dragon dictation software. Although every effort was made to ensure the accuracy of this automated transcription, some errors in transcription may have occurred.

## 2022-10-12 NOTE — SIGNIFICANT EVENT
Writer notified from nursing of significant EMG artifact on LTME. When evaluated at bedside, EEG does in fact have a lot of artifact, discussed with epileptologist  who has concerns for GPDs. Clinically, patient has rhythmic jaw movements. Will load with 1.5g Keppra and administer 5mg Vecuronium along with 6mg Versed IVP to better assess the LTME. Upon further review post medications, patient in burst suppression pattern. Will proceed with Keppra loading dose and continue to evaluate for correlation between jaw movements and GPDs. Will consider a continuous Versed infusion if GPDs continue or increase in frequency. Discussed plan with Dr. Esther Lamb [facility] attending), Dr. Cordell Mendoza (epileptologist), and bedside RN. Updated patient's wife via telephone. All questions answered at this time.      Silvia Pete, BRINDA - CNP  10/11/2022  8:04 PM

## 2022-10-13 NOTE — FLOWSHEET NOTE
Pt without pulse at this time Dr Itzel Rodgers with critical care at bedside to pronounce. TOD 1850. Wife and pts 2 sons at bedside as well as multiple other family members. Support given.

## 2022-10-13 NOTE — PROCEDURES
Referring physician: Shyanne PARRY  Date: 10/12/2022  Start Time:10/12/2022 @ [phone]  End Time: 10/12/2022 @ 1800    Indication  Patient with encephalopathy, EEG done to rule out subclinical seizures. Introduction  This continuous video-EEG was acquired using a [employer] workstation at 256 samples/s. Electrodes were placed according to the International 10-20 system. Automated spike and seizure detection algorithms were applied. Video was recorded during this study. Description  EEG was moderately limited due to EMG artifact, embedded there was suspicion for generalized periodic discharges, after paralytics given there was ~ 1 Hz generalized periodic discharges and at time seem more on the right. No consistent focal slowing or interhemispheric asymmetry was noted. Normal sleep structures were not observed. Events  Patient had frequent jaw clenching movement, it was not clear if correlating with generlaied periodic dicharges or not as EEG was limited with EMG artifact. Impression  Abnormal continuous vEEG recording, the slowing mentioned above suggests severe non specific encephalopathy. Patient passed away by end of study. Generalized periodic discharges and lateralized periodic discharges can be seen on anoxic brain injury, epileptic, sedation vs others. Discussed with managing team.     Zeenat Hilliard MD  Epilepsy Board Certified. Neurology Board Certified.     Electronically Signed

## 2022-10-13 NOTE — FLOWSHEET NOTE
Lifevest and information packet given to Spouse. Box to Life vest was not in room. Wife states that someone took it get it read.

## 2022-10-13 NOTE — PROGRESS NOTES
[facility]   Patient Death Note  DEATH   Shift date: 10/12/22    Shift day: Wednesday  Shift #: 2                 Room # 1714/7730-23   Name: Berry Skaggs Sr.            Age: 60 y.o. Gender: male          Restoration: [address] of [employer]:   Admit Date & Time: 10/10/2022  3:21 AM     Referral: Nurse   Actual date of death: 10/12/22 TOD: 18:50       SITUATION AT DEATH:  Family at bedside tearful and grieving    IS THIS A 'S CASE? No    SPIRITUAL/EMOTIONAL INTERVENTION:  [facility] was a ministry of presence to family allowing space to express feelings and emotions.  offered bedside prayer, blessing, and scripture reading which was accepted by family.  brought grief packet to spouse Katheryne Canavan and explained next steps. Katheryne Canavan stated she had picked  home and to talk to sister for further information which  discussed. Explained release of care from [facility] to [facility] which Katheryne Canavan understood. Family Received Grief Packet? Yes       NAME AND PHONE NUMBER OF DOCTOR SIGNING DEATH CERTIFICATE:  Dr. Miladis Moses [phone]       are now contacting the  home after a patient death.  spoke to [facility]  at [facility] indicating family's choice for their services.  called  home on 10/12/22 at 11:03PM.      Copy of COMPLETED Release of Body Form Received? Yes    Patient's belongings: With Family     HOME:  Name: Camacho Bourgeois: [place]  Phone Number: [phone]    NEXT OF KIN:  Name: Parag Wright  Relationship: Spouse  Street Address: [place] [address]: [place]  State: [place]  Zip code: [address]   Phone Number: [phone]    [facility]? No    IF SO, WHAT?   N/A    Electronically signed by Maria L Parisi, on 10/12/2022 at 8:29 PM.  [facility]  [phone]

## 2022-10-14 LAB — NEURON SPEC ENOLASE: 32 NG/ML

## 2022-10-15 LAB
CULTURE: NORMAL
CULTURE: NORMAL
Lab: NORMAL
Lab: NORMAL
SPECIMEN DESCRIPTION: NORMAL
SPECIMEN DESCRIPTION: NORMAL

## 2022-10-19 ENCOUNTER — TELEPHONE (OUTPATIENT)
Dept: PULMONOLOGY | Age: 62
End: 2022-10-19

## 2022-11-03 NOTE — DEATH NOTES
Death Pronouncement Note  Patient's Name: Darvin Elizondo   Patient's YOB: 1960  MRN Number: [id number]    Admitting Provider: Kendrick Madden MD  Attending Provider: No att. providers found    Patient was examined and the following were absent: Pulses, Blood Pressure, and Respiratory effort    The patient was declared dead on 10/12/2022 at 6:50 PM    Preliminary Cause of Death: Cardiac arrest     Electronically signed by   Marcus Bell MD

## 2022-11-03 NOTE — PLAN OF CARE
DEATH NOTE    PATIENT NAME: Julianna Lombardo Sr. YOB: 1960  MEDICAL RECORD NO. [id number]  DATE: 11/3/2022  PRIMARY CARE PHYSICIAN: Venkat Welch [facility]    DIAGNOSIS OF DEATH     I have confirmed the death of this patient in accordance with accepted medical standards. The patient is dead as evidenced by cardiac death or cessation of brain function:    Cardiac Death (check all that apply):     [x]  Absence of respiratory effort by observation     [x]  Absence of pulse by palpation     []  Absence of blood pressure by sphygmomanometry     [x]  Absence of sustainable cardiac rhythm by monitor    Death by Cessation of Brain Function (check all that apply):     []  Absence of Cerebral Function with no motor response     []  Absence of brain stem function by systemic physical exam     []  Failure to respond with respiratory drive by apnea test     []  Cerebral Electrical silence as interpreted by qualified reader        OR     []  Absence of brain blood flow by radiologic technique      CERTIFICATION OF DEATH     The patient was pronounced dead on:     Date: 10/12/2022 at 6:50 PM     NOTIFICATIONS     Attending physician that will sign Death Certificate:   Dr. Alexa Wood. Family notified: Name and/or Relationship: Ms. Fouzia Pate                                                     notified (Name): not a 's case                                                           []  Per Rodney Mantilla MD  Internal Medicine Resident, PGY-2  Critical Care Service   [facility];  [place], [address]

## 2022-11-03 NOTE — DISCHARGE SUMMARY
[facility]     Department of Internal Medicine - Critical Care Service    INPATIENT DEATH SUMMARY      PATIENT IDENTIFICATION:  NAME:  Mulugeta Francisco Sr.   :   1960  MRN:    [id number]     Acct:    [de-identified]   Admit Date:  10/10/2022  Discharge date:  10/12/2022  6:50 PM   Attending Provider: Dr. Lotus Lemus Problem:    Cardiac arrest [facility])  Active Problems:    Hyperkalemia    Metabolic acidosis    Encounter for palliative care    End stage renal disease ([facility]  Resolved Problems:    * No resolved hospital problems. *       REASON FOR HOSPITALIZATION:   Chief Complaint   Patient presents with    Cardiac Arrest     Arrives per LF4 from scene. Per LF, pt was cardiac arrest per EMS. Pt with ROSC and transported to [facility] to be transported to [facility]. Pt received 2 rounds of epi, unknown amounts of fentanyl and versed. Pt received 1 amp Bicarb and 1 amp calcium chloride. Pt arrives intubated, no sedation. Hospital Course    A 62/M with PMH significant of CAD (s/p CABG and multiple stents), CKD stage IV (recently started on hemodialysis), HTN, type 1 diabetes, recent cardiac arrest , was brought into ER as a postcardiac arrest.  He was intubated in ER for airway support because of decreased mentation. He had cardiogenic shock due to acute on chronic systolic heart failure. Troponins were trended. Because of recent cardiac cath, decision was made to continue with the supportive care and to give dual-chamber AICD prior to discharge. He was placed on hypothermia protocol. CT head was unremarkable for acute intracranial abnormality. CODE STATUS was changed to DNR CCA. He had myoclonic jerks. Neuro critical care was consulted for neuro prognostication as there was concern of anoxic brain injury. He was placed on LTME.   He also received Keppra vecuronium and Versed to achieve burst suppression and better EEG monitoring as he had rhythmic jaw movements. .  He was also placed on CRRT. He was also on unasyn to cover for aspiration pneumonia. He continued to decline. Discussed in detail with the family about patient's clinical condition. Family decided to change the CODE STATUS to DNR CC and withdraw support once the son/grandson arrive. He was declared dead on 10/12/2022 at [address] [place] [address] by Dr. Jarret Altamirano.      Consults:   cardiology, nephrology, neurology, and neuro critical care    Procedures:    Central line placement    Any Hospital Acquired Infections:   none    PATIENT'S DISCHARGE CONDITION:        Disposition: Nelva Favre